# Patient Record
Sex: MALE
[De-identification: names, ages, dates, MRNs, and addresses within clinical notes are randomized per-mention and may not be internally consistent; named-entity substitution may affect disease eponyms.]

---

## 2024-05-15 PROBLEM — Z00.00 ENCOUNTER FOR PREVENTIVE HEALTH EXAMINATION: Status: ACTIVE | Noted: 2024-05-15

## 2024-05-17 ENCOUNTER — APPOINTMENT (OUTPATIENT)
Dept: CT IMAGING | Facility: CLINIC | Age: 79
End: 2024-05-17
Payer: MEDICARE

## 2024-05-17 PROCEDURE — 71260 CT THORAX DX C+: CPT

## 2024-05-17 PROCEDURE — 74177 CT ABD & PELVIS W/CONTRAST: CPT

## 2024-05-27 PROBLEM — K63.89 MASS OF COLON: Status: ACTIVE | Noted: 2024-05-27

## 2024-05-28 ENCOUNTER — APPOINTMENT (OUTPATIENT)
Dept: SURGICAL ONCOLOGY | Facility: CLINIC | Age: 79
End: 2024-05-28
Payer: MEDICARE

## 2024-05-28 VITALS
OXYGEN SATURATION: 96 % | BODY MASS INDEX: 28.12 KG/M2 | HEART RATE: 82 BPM | SYSTOLIC BLOOD PRESSURE: 127 MMHG | HEIGHT: 66 IN | DIASTOLIC BLOOD PRESSURE: 77 MMHG | WEIGHT: 175 LBS

## 2024-05-28 DIAGNOSIS — K63.89 OTHER SPECIFIED DISEASES OF INTESTINE: ICD-10-CM

## 2024-05-28 DIAGNOSIS — Z95.5 PRESENCE OF CORONARY ANGIOPLASTY IMPLANT AND GRAFT: ICD-10-CM

## 2024-05-28 DIAGNOSIS — Z87.891 PERSONAL HISTORY OF NICOTINE DEPENDENCE: ICD-10-CM

## 2024-05-28 PROCEDURE — 99204 OFFICE O/P NEW MOD 45 MIN: CPT

## 2024-05-28 RX ORDER — ATORVASTATIN CALCIUM 80 MG/1
TABLET, FILM COATED ORAL
Refills: 0 | Status: ACTIVE | COMMUNITY

## 2024-05-28 RX ORDER — CLOPIDOGREL 75 MG/1
75 TABLET, FILM COATED ORAL
Refills: 0 | Status: ACTIVE | COMMUNITY

## 2024-05-28 RX ORDER — ASPIRIN 81 MG
81 TABLET, DELAYED RELEASE (ENTERIC COATED) ORAL
Refills: 0 | Status: ACTIVE | COMMUNITY

## 2024-05-28 RX ORDER — DOLUTEGRAVIR SODIUM 50 MG/1
TABLET, FILM COATED ORAL
Refills: 0 | Status: ACTIVE | COMMUNITY

## 2024-05-28 RX ORDER — DARUNAVIR, COBICISTAT, EMTRICITABINE, AND TENOFOVIR ALAFENAMIDE 800; 150; 200; 10 MG/1; MG/1; MG/1; MG/1
800-150-200-10 TABLET, FILM COATED ORAL
Refills: 0 | Status: ACTIVE | COMMUNITY

## 2024-05-31 NOTE — ASSESSMENT
[FreeTextEntry1] : IMP: 77y/o M referred by Dr. Cadet for 50mm mass in the distal sigmoid colon and proximal rectum noted on colonoscopy exam 5/13/24. 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 Patient expressed verbal understanding. Written informed consent to be obtained in the preoperative period. I have discussed the diagnosis, therapeutic plan and options with the patient at length. Patient expressed verbal understanding to proceed with proposed plan. All questions answered.

## 2024-05-31 NOTE — HISTORY OF PRESENT ILLNESS
[de-identified] : Mr. ALBERTINA MASSEY is a 77y/o M referred by Dr. Cadet for 50mm mass in the distal sigmoid colon and proximal rectum noted on colonoscopy exam 5/13/24. Exam indication - Iron deficiency anemia.  CT C/A/P noted known cancer in the rectosigmoid colon with mildly prominent adjacent lymph nodes. Also, 2 nonspecific nodules in the lung bases. Colon Mass biopsied. 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

## 2024-05-31 NOTE — PHYSICAL EXAM
[FreeTextEntry1] : COVID -19 precautions as per [facility] policy was universally followed. [Normal] : supple, no neck mass and thyroid not enlarged [Normal Neck Lymph Nodes] : normal neck lymph nodes  [Normal Supraclavicular Lymph Nodes] : normal supraclavicular lymph nodes [Normal Groin Lymph Nodes] : normal groin lymph nodes [Normal Axillary Lymph Nodes] : normal axillary lymph nodes [Normal] : oriented to person, place and time, with appropriate affect [de-identified] : soft NTND

## 2024-05-31 NOTE — CONSULT LETTER
[FreeTextEntry3] : Benjamin Lao MD, CAMPBELL, FACS Director of Surgical Oncology- [facility] , Department of Surgery [facility] [place] [id number] Ph: [phone] Cell: [phone]

## 2024-06-12 ENCOUNTER — LABORATORY RESULT (OUTPATIENT)
Age: 79
End: 2024-06-12

## 2024-06-15 ENCOUNTER — APPOINTMENT (OUTPATIENT)
Dept: NUCLEAR MEDICINE | Facility: CLINIC | Age: 79
End: 2024-06-15
Payer: MEDICARE

## 2024-06-15 PROCEDURE — 78815 PET IMAGE W/CT SKULL-THIGH: CPT | Mod: PI

## 2024-06-15 PROCEDURE — A9552: CPT

## 2024-06-24 RX ORDER — PEG-3350, SODIUM SULFATE, SODIUM CHLORIDE, POTASSIUM CHLORIDE, SODIUM ASCORBATE AND ASCORBIC ACID 7.5-2.691G
100 KIT ORAL
Qty: 1 | Refills: 0 | Status: ACTIVE | COMMUNITY
Start: 2024-06-24 | End: 1900-01-01

## 2024-06-24 RX ORDER — NEOMYCIN SULFATE 500 MG/1
500 TABLET ORAL
Qty: 6 | Refills: 0 | Status: ACTIVE | COMMUNITY
Start: 2024-06-24 | End: 1900-01-01

## 2024-06-24 RX ORDER — METRONIDAZOLE 250 MG/1
250 TABLET ORAL
Qty: 3 | Refills: 0 | Status: ACTIVE | COMMUNITY
Start: 2024-06-24 | End: 1900-01-01

## 2024-06-24 RX ORDER — POTASSIUM CHLORIDE 1500 MG/1
20 TABLET, FILM COATED, EXTENDED RELEASE ORAL
Qty: 4 | Refills: 0 | Status: ACTIVE | COMMUNITY
Start: 2024-06-24 | End: 1900-01-01

## 2024-07-05 ENCOUNTER — APPOINTMENT (OUTPATIENT)
Dept: MRI IMAGING | Facility: CLINIC | Age: 79
End: 2024-07-05
Payer: MEDICARE

## 2024-07-05 PROCEDURE — 74183 MRI ABD W/O CNTR FLWD CNTR: CPT

## 2024-07-05 PROCEDURE — A9585: CPT | Mod: JW

## 2024-07-09 ENCOUNTER — OUTPATIENT (OUTPATIENT)
Dept: OUTPATIENT SERVICES | Facility: HOSPITAL | Age: 79
LOS: 1 days | End: 2024-07-09
Payer: MEDICARE

## 2024-07-09 VITALS
OXYGEN SATURATION: 98 % | DIASTOLIC BLOOD PRESSURE: 80 MMHG | WEIGHT: 171.96 LBS | HEIGHT: 63 IN | TEMPERATURE: 98 F | HEART RATE: 71 BPM | RESPIRATION RATE: 18 BRPM | SYSTOLIC BLOOD PRESSURE: 128 MMHG

## 2024-07-09 DIAGNOSIS — I10 ESSENTIAL (PRIMARY) HYPERTENSION: ICD-10-CM

## 2024-07-09 DIAGNOSIS — E78.5 HYPERLIPIDEMIA, UNSPECIFIED: ICD-10-CM

## 2024-07-09 DIAGNOSIS — Z90.89 ACQUIRED ABSENCE OF OTHER ORGANS: Chronic | ICD-10-CM

## 2024-07-09 DIAGNOSIS — Z01.818 ENCOUNTER FOR OTHER PREPROCEDURAL EXAMINATION: ICD-10-CM

## 2024-07-09 DIAGNOSIS — Z21 ASYMPTOMATIC HUMAN IMMUNODEFICIENCY VIRUS [HIV] INFECTION STATUS: ICD-10-CM

## 2024-07-09 DIAGNOSIS — Z29.9 ENCOUNTER FOR PROPHYLACTIC MEASURES, UNSPECIFIED: ICD-10-CM

## 2024-07-09 DIAGNOSIS — K63.89 OTHER SPECIFIED DISEASES OF INTESTINE: ICD-10-CM

## 2024-07-09 LAB
ANION GAP SERPL CALC-SCNC: 6 MMOL/L — SIGNIFICANT CHANGE UP (ref 5–17)
APTT BLD: 38.4 SEC — HIGH (ref 24.5–35.6)
BLD GP AB SCN SERPL QL: SIGNIFICANT CHANGE UP
BUN SERPL-MCNC: 7 MG/DL — SIGNIFICANT CHANGE UP (ref 7–18)
CALCIUM SERPL-MCNC: 9.1 MG/DL — SIGNIFICANT CHANGE UP (ref 8.4–10.5)
CHLORIDE SERPL-SCNC: 109 MMOL/L — HIGH (ref 96–108)
CO2 SERPL-SCNC: 27 MMOL/L — SIGNIFICANT CHANGE UP (ref 22–31)
CREAT SERPL-MCNC: 0.97 MG/DL — SIGNIFICANT CHANGE UP (ref 0.5–1.3)
EGFR: 81 ML/MIN/1.73M2 — SIGNIFICANT CHANGE UP
GLUCOSE SERPL-MCNC: 112 MG/DL — HIGH (ref 70–99)
HCT VFR BLD CALC: 42.8 % — SIGNIFICANT CHANGE UP (ref 39–50)
HGB BLD-MCNC: 13.8 G/DL — SIGNIFICANT CHANGE UP (ref 13–17)
INR BLD: 1.05 RATIO — SIGNIFICANT CHANGE UP (ref 0.85–1.18)
MCHC RBC-ENTMCNC: 29.7 PG — SIGNIFICANT CHANGE UP (ref 27–34)
MCHC RBC-ENTMCNC: 32.2 GM/DL — SIGNIFICANT CHANGE UP (ref 32–36)
MCV RBC AUTO: 92 FL — SIGNIFICANT CHANGE UP (ref 80–100)
NRBC # BLD: 0 /100 WBCS — SIGNIFICANT CHANGE UP (ref 0–0)
PLATELET # BLD AUTO: 281 K/UL — SIGNIFICANT CHANGE UP (ref 150–400)
POTASSIUM SERPL-MCNC: 4.7 MMOL/L — SIGNIFICANT CHANGE UP (ref 3.5–5.3)
POTASSIUM SERPL-SCNC: 4.7 MMOL/L — SIGNIFICANT CHANGE UP (ref 3.5–5.3)
PROTHROM AB SERPL-ACNC: 11.9 SEC — SIGNIFICANT CHANGE UP (ref 9.5–13)
RBC # BLD: 4.65 M/UL — SIGNIFICANT CHANGE UP (ref 4.2–5.8)
RBC # FLD: 13.5 % — SIGNIFICANT CHANGE UP (ref 10.3–14.5)
SODIUM SERPL-SCNC: 142 MMOL/L — SIGNIFICANT CHANGE UP (ref 135–145)
WBC # BLD: 13.07 K/UL — HIGH (ref 3.8–10.5)
WBC # FLD AUTO: 13.07 K/UL — HIGH (ref 3.8–10.5)

## 2024-07-09 NOTE — H&P PST ADULT - HISTORY OF PRESENT ILLNESS
79 yo male with history of HLD, HIV (viral load <20), reports the above.  Patient states has not had a colonoscopy for approximately nine years, and a routine colonoscopy this year revealed a mass in the left lower side of the intestine.  He is scheduled for : Robotic Assisted Low Anterior Resection, on 7/18/24

## 2024-07-09 NOTE — H&P PST ADULT - NSICDXPROCEDURE_GEN_ALL_CORE_FT
PROCEDURES:  Laparoscopic surgical removal of colon with anastomosis and colostomy 09-Jul-2024 10:44:23  Ivy Candelario

## 2024-07-09 NOTE — H&P PST ADULT - NSANTHOSAYNRD_GEN_A_CORE
No. KRIS screening performed.  STOP BANG Legend: 0-2 = LOW Risk; 3-4 = INTERMEDIATE Risk; 5-8 = HIGH Risk

## 2024-07-09 NOTE — H&P PST ADULT - PROBLEM SELECTOR PLAN 3
Continue all medications as prescribed  F/U with provider for viral load monitoring  Recent viral load <20

## 2024-07-10 LAB
A1C WITH ESTIMATED AVERAGE GLUCOSE RESULT: 5.7 % — HIGH (ref 4–5.6)
ESTIMATED AVERAGE GLUCOSE: 117 MG/DL — HIGH (ref 68–114)

## 2024-07-10 PROCEDURE — 36415 COLL VENOUS BLD VENIPUNCTURE: CPT

## 2024-07-10 PROCEDURE — 86900 BLOOD TYPING SEROLOGIC ABO: CPT

## 2024-07-10 PROCEDURE — 85610 PROTHROMBIN TIME: CPT

## 2024-07-10 PROCEDURE — 86850 RBC ANTIBODY SCREEN: CPT

## 2024-07-10 PROCEDURE — 83036 HEMOGLOBIN GLYCOSYLATED A1C: CPT

## 2024-07-10 PROCEDURE — 85027 COMPLETE CBC AUTOMATED: CPT

## 2024-07-10 PROCEDURE — 80048 BASIC METABOLIC PNL TOTAL CA: CPT

## 2024-07-10 PROCEDURE — 85730 THROMBOPLASTIN TIME PARTIAL: CPT

## 2024-07-10 PROCEDURE — 86901 BLOOD TYPING SEROLOGIC RH(D): CPT

## 2024-07-10 PROCEDURE — G0463: CPT

## 2024-07-17 PROBLEM — Z21 ASYMPTOMATIC HUMAN IMMUNODEFICIENCY VIRUS [HIV] INFECTION STATUS: Chronic | Status: ACTIVE | Noted: 2024-07-09

## 2024-07-17 PROBLEM — E78.5 HYPERLIPIDEMIA, UNSPECIFIED: Chronic | Status: ACTIVE | Noted: 2024-07-09

## 2024-07-18 ENCOUNTER — APPOINTMENT (OUTPATIENT)
Dept: SURGICAL ONCOLOGY | Facility: HOSPITAL | Age: 79
End: 2024-07-18

## 2024-07-18 RX ORDER — POTASSIUM CHLORIDE 600 MG/1
1 TABLET, FILM COATED, EXTENDED RELEASE ORAL
Refills: 0 | DISCHARGE

## 2024-07-18 RX ORDER — FERROUS SULFATE 325(65) MG
1 TABLET ORAL
Refills: 0 | DISCHARGE

## 2024-07-18 RX ORDER — METRONIDAZOLE 500 MG/1
1 TABLET ORAL
Refills: 0 | DISCHARGE

## 2024-07-18 RX ORDER — ASPIRIN 325 MG/1
1 TABLET, FILM COATED ORAL
Refills: 0 | DISCHARGE

## 2024-07-18 RX ORDER — PEG3350/SOD SULF,BICARB,CL/KCL 240-22.72G
0 SOLUTION, RECONSTITUTED, ORAL ORAL
Refills: 0 | DISCHARGE

## 2024-07-18 RX ORDER — DOLUTEGRAVIR SODIUM 5 MG/1
1 TABLET, FOR SUSPENSION ORAL
Refills: 0 | DISCHARGE

## 2024-07-18 RX ORDER — ATORVASTATIN CALCIUM 20 MG/1
1 TABLET, FILM COATED ORAL
Refills: 0 | DISCHARGE

## 2024-07-19 ENCOUNTER — APPOINTMENT (OUTPATIENT)
Dept: INTERVENTIONAL RADIOLOGY/VASCULAR | Facility: HOSPITAL | Age: 79
End: 2024-07-19

## 2024-07-19 ENCOUNTER — OUTPATIENT (OUTPATIENT)
Dept: OUTPATIENT SERVICES | Facility: HOSPITAL | Age: 79
LOS: 1 days | End: 2024-07-19
Payer: MEDICARE

## 2024-07-19 ENCOUNTER — TRANSCRIPTION ENCOUNTER (OUTPATIENT)
Age: 79
End: 2024-07-19

## 2024-07-19 VITALS
HEART RATE: 69 BPM | DIASTOLIC BLOOD PRESSURE: 87 MMHG | SYSTOLIC BLOOD PRESSURE: 155 MMHG | RESPIRATION RATE: 14 BRPM | TEMPERATURE: 98 F

## 2024-07-19 VITALS
SYSTOLIC BLOOD PRESSURE: 145 MMHG | DIASTOLIC BLOOD PRESSURE: 81 MMHG | OXYGEN SATURATION: 97 % | TEMPERATURE: 98 F | HEART RATE: 70 BPM | RESPIRATION RATE: 16 BRPM

## 2024-07-19 DIAGNOSIS — D64.9 ANEMIA, UNSPECIFIED: ICD-10-CM

## 2024-07-19 DIAGNOSIS — Z90.89 ACQUIRED ABSENCE OF OTHER ORGANS: Chronic | ICD-10-CM

## 2024-07-19 DIAGNOSIS — R60.9 EDEMA, UNSPECIFIED: ICD-10-CM

## 2024-07-19 PROCEDURE — 77001 FLUOROGUIDE FOR VEIN DEVICE: CPT

## 2024-07-19 PROCEDURE — C1769: CPT

## 2024-07-19 PROCEDURE — 76937 US GUIDE VASCULAR ACCESS: CPT

## 2024-07-19 PROCEDURE — 76937 US GUIDE VASCULAR ACCESS: CPT | Mod: 26

## 2024-07-19 PROCEDURE — 77001 FLUOROGUIDE FOR VEIN DEVICE: CPT | Mod: 26

## 2024-07-19 PROCEDURE — 36558 INSERT TUNNELED CV CATH: CPT

## 2024-07-19 PROCEDURE — C1788: CPT

## 2024-07-19 RX ORDER — HYDROMORPHONE HCL 0.2 MG/ML
1 INJECTION, SOLUTION INTRAVENOUS
Refills: 0 | Status: DISCONTINUED | OUTPATIENT
Start: 2024-07-19 | End: 2024-07-19

## 2024-07-19 RX ORDER — HYDROMORPHONE HCL 0.2 MG/ML
0.5 INJECTION, SOLUTION INTRAVENOUS
Refills: 0 | Status: DISCONTINUED | OUTPATIENT
Start: 2024-07-19 | End: 2024-07-19

## 2024-07-19 RX ORDER — ONDANSETRON HYDROCHLORIDE 2 MG/ML
4 INJECTION INTRAMUSCULAR; INTRAVENOUS ONCE
Refills: 0 | Status: DISCONTINUED | OUTPATIENT
Start: 2024-07-19 | End: 2024-07-19

## 2024-07-19 RX ORDER — DEXTROSE MONOHYDRATE AND SODIUM CHLORIDE 5; .3 G/100ML; G/100ML
1000 INJECTION, SOLUTION INTRAVENOUS
Refills: 0 | Status: DISCONTINUED | OUTPATIENT
Start: 2024-07-19 | End: 2024-07-19

## 2024-07-19 NOTE — ASU DISCHARGE PLAN (ADULT/PEDIATRIC) - CARE PROVIDER_API CALL
Sulema Cadet  Hematology/Oncology  [address], [address]  [place] [address]  Phone: [phone]  Fax: [phone]  Follow Up Time:

## 2024-10-14 ENCOUNTER — APPOINTMENT (OUTPATIENT)
Dept: MRI IMAGING | Facility: CLINIC | Age: 79
End: 2024-10-14
Payer: MEDICARE

## 2024-10-14 PROCEDURE — 74183 MRI ABD W/O CNTR FLWD CNTR: CPT

## 2024-10-14 PROCEDURE — A9585: CPT | Mod: JW

## 2024-10-24 ENCOUNTER — INPATIENT (INPATIENT)
Facility: HOSPITAL | Age: 79
LOS: 4 days | Discharge: HOME | DRG: 195 | End: 2024-10-29
Attending: STUDENT IN AN ORGANIZED HEALTH CARE EDUCATION/TRAINING PROGRAM | Admitting: STUDENT IN AN ORGANIZED HEALTH CARE EDUCATION/TRAINING PROGRAM
Payer: MEDICARE

## 2024-10-24 VITALS
TEMPERATURE: 98 F | SYSTOLIC BLOOD PRESSURE: 152 MMHG | RESPIRATION RATE: 18 BRPM | DIASTOLIC BLOOD PRESSURE: 96 MMHG | HEIGHT: 66 IN | OXYGEN SATURATION: 99 % | WEIGHT: 156.53 LBS | HEART RATE: 107 BPM

## 2024-10-24 DIAGNOSIS — E78.5 HYPERLIPIDEMIA, UNSPECIFIED: ICD-10-CM

## 2024-10-24 DIAGNOSIS — Z90.89 ACQUIRED ABSENCE OF OTHER ORGANS: Chronic | ICD-10-CM

## 2024-10-24 DIAGNOSIS — R03.0 ELEVATED BLOOD-PRESSURE READING, WITHOUT DIAGNOSIS OF HYPERTENSION: ICD-10-CM

## 2024-10-24 DIAGNOSIS — J18.9 PNEUMONIA, UNSPECIFIED ORGANISM: ICD-10-CM

## 2024-10-24 DIAGNOSIS — Z29.9 ENCOUNTER FOR PROPHYLACTIC MEASURES, UNSPECIFIED: ICD-10-CM

## 2024-10-24 DIAGNOSIS — D69.6 THROMBOCYTOPENIA, UNSPECIFIED: ICD-10-CM

## 2024-10-24 DIAGNOSIS — Z21 ASYMPTOMATIC HUMAN IMMUNODEFICIENCY VIRUS [HIV] INFECTION STATUS: ICD-10-CM

## 2024-10-24 LAB
ALBUMIN SERPL ELPH-MCNC: 3.2 G/DL — LOW (ref 3.5–5)
ALP SERPL-CCNC: 81 U/L — SIGNIFICANT CHANGE UP (ref 40–120)
ALT FLD-CCNC: 21 U/L DA — SIGNIFICANT CHANGE UP (ref 10–60)
ANION GAP SERPL CALC-SCNC: 5 MMOL/L — SIGNIFICANT CHANGE UP (ref 5–17)
ANISOCYTOSIS BLD QL: SLIGHT — SIGNIFICANT CHANGE UP
APPEARANCE UR: CLEAR — SIGNIFICANT CHANGE UP
AST SERPL-CCNC: 17 U/L — SIGNIFICANT CHANGE UP (ref 10–40)
BACTERIA # UR AUTO: ABNORMAL /HPF
BASOPHILS # BLD AUTO: 0 K/UL — SIGNIFICANT CHANGE UP (ref 0–0.2)
BASOPHILS NFR BLD AUTO: 0 % — SIGNIFICANT CHANGE UP (ref 0–2)
BILIRUB SERPL-MCNC: 1.3 MG/DL — HIGH (ref 0.2–1.2)
BILIRUB UR-MCNC: NEGATIVE — SIGNIFICANT CHANGE UP
BUN SERPL-MCNC: 7 MG/DL — SIGNIFICANT CHANGE UP (ref 7–18)
CALCIUM SERPL-MCNC: 9.1 MG/DL — SIGNIFICANT CHANGE UP (ref 8.4–10.5)
CHLORIDE SERPL-SCNC: 100 MMOL/L — SIGNIFICANT CHANGE UP (ref 96–108)
CK SERPL-CCNC: 39 U/L — SIGNIFICANT CHANGE UP (ref 35–232)
CO2 SERPL-SCNC: 27 MMOL/L — SIGNIFICANT CHANGE UP (ref 22–31)
COLOR SPEC: YELLOW — SIGNIFICANT CHANGE UP
CREAT SERPL-MCNC: 0.9 MG/DL — SIGNIFICANT CHANGE UP (ref 0.5–1.3)
D DIMER BLD IA.RAPID-MCNC: 2538 NG/ML DDU — HIGH
DIFF PNL FLD: NEGATIVE — SIGNIFICANT CHANGE UP
EGFR: 88 ML/MIN/1.73M2 — SIGNIFICANT CHANGE UP
ELLIPTOCYTES BLD QL SMEAR: SLIGHT — SIGNIFICANT CHANGE UP
EOSINOPHIL # BLD AUTO: 0 K/UL — SIGNIFICANT CHANGE UP (ref 0–0.5)
EOSINOPHIL NFR BLD AUTO: 0 % — SIGNIFICANT CHANGE UP (ref 0–6)
EPI CELLS # UR: SIGNIFICANT CHANGE UP
GLUCOSE SERPL-MCNC: 160 MG/DL — HIGH (ref 70–99)
GLUCOSE UR QL: 100 MG/DL
HCT VFR BLD CALC: 43.7 % — SIGNIFICANT CHANGE UP (ref 39–50)
HGB BLD-MCNC: 14.9 G/DL — SIGNIFICANT CHANGE UP (ref 13–17)
KETONES UR-MCNC: ABNORMAL MG/DL
LACTATE SERPL-SCNC: 1.8 MMOL/L — SIGNIFICANT CHANGE UP (ref 0.7–2)
LEUKOCYTE ESTERASE UR-ACNC: NEGATIVE — SIGNIFICANT CHANGE UP
LG PLATELETS BLD QL AUTO: SLIGHT — SIGNIFICANT CHANGE UP
LIDOCAIN IGE QN: 29 U/L — SIGNIFICANT CHANGE UP (ref 13–75)
LYMPHOCYTES # BLD AUTO: 0.75 K/UL — LOW (ref 1–3.3)
LYMPHOCYTES # BLD AUTO: 21 % — SIGNIFICANT CHANGE UP (ref 13–44)
MAGNESIUM SERPL-MCNC: 1.7 MG/DL — SIGNIFICANT CHANGE UP (ref 1.6–2.6)
MANUAL SMEAR VERIFICATION: SIGNIFICANT CHANGE UP
MCHC RBC-ENTMCNC: 30.8 PG — SIGNIFICANT CHANGE UP (ref 27–34)
MCHC RBC-ENTMCNC: 34.1 GM/DL — SIGNIFICANT CHANGE UP (ref 32–36)
MCV RBC AUTO: 90.3 FL — SIGNIFICANT CHANGE UP (ref 80–100)
MICROCYTES BLD QL: SLIGHT — SIGNIFICANT CHANGE UP
MONOCYTES # BLD AUTO: 0.57 K/UL — SIGNIFICANT CHANGE UP (ref 0–0.9)
MONOCYTES NFR BLD AUTO: 16 % — HIGH (ref 2–14)
NEUTROPHILS # BLD AUTO: 2.26 K/UL — SIGNIFICANT CHANGE UP (ref 1.8–7.4)
NEUTROPHILS NFR BLD AUTO: 57 % — SIGNIFICANT CHANGE UP (ref 43–77)
NEUTS BAND # BLD: 6 % — SIGNIFICANT CHANGE UP (ref 0–8)
NITRITE UR-MCNC: NEGATIVE — SIGNIFICANT CHANGE UP
NRBC # BLD: 0 /100 WBCS — SIGNIFICANT CHANGE UP (ref 0–0)
NT-PROBNP SERPL-SCNC: 1210 PG/ML — HIGH (ref 0–450)
PH UR: 7 — SIGNIFICANT CHANGE UP (ref 5–8)
PLAT MORPH BLD: NORMAL — SIGNIFICANT CHANGE UP
PLATELET # BLD AUTO: 93 K/UL — LOW (ref 150–400)
PLATELET COUNT - ESTIMATE: ABNORMAL
POIKILOCYTOSIS BLD QL AUTO: SLIGHT — SIGNIFICANT CHANGE UP
POTASSIUM SERPL-MCNC: 4.6 MMOL/L — SIGNIFICANT CHANGE UP (ref 3.5–5.3)
POTASSIUM SERPL-SCNC: 4.6 MMOL/L — SIGNIFICANT CHANGE UP (ref 3.5–5.3)
PROT SERPL-MCNC: 6.6 G/DL — SIGNIFICANT CHANGE UP (ref 6–8.3)
PROT UR-MCNC: ABNORMAL MG/DL
RBC # BLD: 4.84 M/UL — SIGNIFICANT CHANGE UP (ref 4.2–5.8)
RBC # FLD: 17.5 % — HIGH (ref 10.3–14.5)
RBC BLD AUTO: ABNORMAL
RBC CASTS # UR COMP ASSIST: SIGNIFICANT CHANGE UP /HPF
SODIUM SERPL-SCNC: 132 MMOL/L — LOW (ref 135–145)
SP GR SPEC: 1.04 — HIGH (ref 1–1.03)
TROPONIN I, HIGH SENSITIVITY RESULT: 45 NG/L — SIGNIFICANT CHANGE UP
UROBILINOGEN FLD QL: 1 MG/DL — SIGNIFICANT CHANGE UP (ref 0.2–1)
WBC # BLD: 3.58 K/UL — LOW (ref 3.8–10.5)
WBC # FLD AUTO: 3.58 K/UL — LOW (ref 3.8–10.5)
WBC UR QL: 2 /HPF — SIGNIFICANT CHANGE UP (ref 0–5)

## 2024-10-24 PROCEDURE — 71275 CT ANGIOGRAPHY CHEST: CPT | Mod: 26,MC

## 2024-10-24 PROCEDURE — 93010 ELECTROCARDIOGRAM REPORT: CPT

## 2024-10-24 PROCEDURE — 99285 EMERGENCY DEPT VISIT HI MDM: CPT

## 2024-10-24 PROCEDURE — 74177 CT ABD & PELVIS W/CONTRAST: CPT | Mod: 26,MC

## 2024-10-24 PROCEDURE — 99223 1ST HOSP IP/OBS HIGH 75: CPT | Mod: GC

## 2024-10-24 RX ORDER — MAGNESIUM, ALUMINUM HYDROXIDE 200-200 MG
30 TABLET,CHEWABLE ORAL EVERY 4 HOURS
Refills: 0 | Status: DISCONTINUED | OUTPATIENT
Start: 2024-10-24 | End: 2024-10-29

## 2024-10-24 RX ORDER — ONDANSETRON HYDROCHLORIDE 2 MG/ML
4 INJECTION, SOLUTION INTRAMUSCULAR; INTRAVENOUS ONCE
Refills: 0 | Status: COMPLETED | OUTPATIENT
Start: 2024-10-24 | End: 2024-10-24

## 2024-10-24 RX ORDER — ACETAMINOPHEN 500 MG
1000 TABLET ORAL ONCE
Refills: 0 | Status: COMPLETED | OUTPATIENT
Start: 2024-10-24 | End: 2024-10-24

## 2024-10-24 RX ORDER — ASCORBIC ACID 500 MG
500 TABLET ORAL DAILY
Refills: 0 | Status: DISCONTINUED | OUTPATIENT
Start: 2024-10-24 | End: 2024-10-29

## 2024-10-24 RX ORDER — INFLUENZ VIR VAC TV P-SURF2003 15MCG/.5ML
0.5 SYRINGE (ML) INTRAMUSCULAR ONCE
Refills: 0 | Status: DISCONTINUED | OUTPATIENT
Start: 2024-10-24 | End: 2024-10-29

## 2024-10-24 RX ORDER — AZITHROMYCIN DIHYDRATE 200 MG/5ML
500 POWDER, FOR SUSPENSION ORAL EVERY 24 HOURS
Refills: 0 | Status: COMPLETED | OUTPATIENT
Start: 2024-10-25 | End: 2024-10-26

## 2024-10-24 RX ORDER — AZITHROMYCIN DIHYDRATE 200 MG/5ML
500 POWDER, FOR SUSPENSION ORAL ONCE
Refills: 0 | Status: COMPLETED | OUTPATIENT
Start: 2024-10-24 | End: 2024-10-24

## 2024-10-24 RX ORDER — CEFTRIAXONE SODIUM 10 G
1000 VIAL (EA) INJECTION ONCE
Refills: 0 | Status: COMPLETED | OUTPATIENT
Start: 2024-10-24 | End: 2024-10-24

## 2024-10-24 RX ORDER — SODIUM CHLORIDE 9 MG/ML
1000 INJECTION, SOLUTION INTRAMUSCULAR; INTRAVENOUS; SUBCUTANEOUS
Refills: 0 | Status: DISCONTINUED | OUTPATIENT
Start: 2024-10-24 | End: 2024-10-24

## 2024-10-24 RX ORDER — CEFTRIAXONE SODIUM 10 G
1000 VIAL (EA) INJECTION EVERY 24 HOURS
Refills: 0 | Status: COMPLETED | OUTPATIENT
Start: 2024-10-24 | End: 2024-10-28

## 2024-10-24 RX ORDER — FAMOTIDINE 10 MG/ML
20 INJECTION INTRAVENOUS ONCE
Refills: 0 | Status: COMPLETED | OUTPATIENT
Start: 2024-10-24 | End: 2024-10-24

## 2024-10-24 RX ORDER — MORPHINE SULFATE 30 MG/1
4 TABLET, EXTENDED RELEASE ORAL ONCE
Refills: 0 | Status: DISCONTINUED | OUTPATIENT
Start: 2024-10-24 | End: 2024-10-24

## 2024-10-24 RX ORDER — ACETAMINOPHEN 500 MG
650 TABLET ORAL EVERY 6 HOURS
Refills: 0 | Status: DISCONTINUED | OUTPATIENT
Start: 2024-10-24 | End: 2024-10-29

## 2024-10-24 RX ORDER — DOLUTEGRAVIR SODIUM 10 MG/1
50 TABLET, FILM COATED ORAL DAILY
Refills: 0 | Status: DISCONTINUED | OUTPATIENT
Start: 2024-10-24 | End: 2024-10-29

## 2024-10-24 RX ORDER — FERROUS SULFATE 325(65) MG
325 TABLET ORAL DAILY
Refills: 0 | Status: DISCONTINUED | OUTPATIENT
Start: 2024-10-24 | End: 2024-10-29

## 2024-10-24 RX ORDER — HEPARIN SODIUM 10000 [USP'U]/ML
5000 INJECTION INTRAVENOUS; SUBCUTANEOUS EVERY 8 HOURS
Refills: 0 | Status: DISCONTINUED | OUTPATIENT
Start: 2024-10-24 | End: 2024-10-25

## 2024-10-24 RX ORDER — SODIUM CHLORIDE 9 MG/ML
1000 INJECTION, SOLUTION INTRAMUSCULAR; INTRAVENOUS; SUBCUTANEOUS ONCE
Refills: 0 | Status: COMPLETED | OUTPATIENT
Start: 2024-10-24 | End: 2024-10-24

## 2024-10-24 RX ORDER — SODIUM CHLORIDE 9 MG/ML
1000 INJECTION, SOLUTION INTRAMUSCULAR; INTRAVENOUS; SUBCUTANEOUS
Refills: 0 | Status: DISCONTINUED | OUTPATIENT
Start: 2024-10-24 | End: 2024-10-25

## 2024-10-24 RX ORDER — ONDANSETRON HYDROCHLORIDE 2 MG/ML
4 INJECTION, SOLUTION INTRAMUSCULAR; INTRAVENOUS EVERY 8 HOURS
Refills: 0 | Status: DISCONTINUED | OUTPATIENT
Start: 2024-10-24 | End: 2024-10-29

## 2024-10-24 RX ORDER — ASPIRIN/MAG CARB/ALUMINUM AMIN 325 MG
81 TABLET ORAL DAILY
Refills: 0 | Status: DISCONTINUED | OUTPATIENT
Start: 2024-10-24 | End: 2024-10-29

## 2024-10-24 RX ORDER — MELATONIN 5 MG
3 TABLET ORAL AT BEDTIME
Refills: 0 | Status: DISCONTINUED | OUTPATIENT
Start: 2024-10-24 | End: 2024-10-29

## 2024-10-24 RX ADMIN — SODIUM CHLORIDE 100 MILLILITER(S): 9 INJECTION, SOLUTION INTRAMUSCULAR; INTRAVENOUS; SUBCUTANEOUS at 23:55

## 2024-10-24 RX ADMIN — Medication 81 MILLIGRAM(S): at 23:34

## 2024-10-24 RX ADMIN — Medication 100 MILLIGRAM(S): at 17:35

## 2024-10-24 RX ADMIN — Medication 650 MILLIGRAM(S): at 23:51

## 2024-10-24 RX ADMIN — DOLUTEGRAVIR SODIUM 50 MILLIGRAM(S): 10 TABLET, FILM COATED ORAL at 23:33

## 2024-10-24 RX ADMIN — Medication 400 MILLIGRAM(S): at 20:00

## 2024-10-24 RX ADMIN — Medication 3 MILLIGRAM(S): at 23:34

## 2024-10-24 RX ADMIN — FAMOTIDINE 20 MILLIGRAM(S): 10 INJECTION INTRAVENOUS at 17:34

## 2024-10-24 RX ADMIN — SODIUM CHLORIDE 1000 MILLILITER(S): 9 INJECTION, SOLUTION INTRAMUSCULAR; INTRAVENOUS; SUBCUTANEOUS at 14:30

## 2024-10-24 RX ADMIN — SODIUM CHLORIDE 1000 MILLILITER(S): 9 INJECTION, SOLUTION INTRAMUSCULAR; INTRAVENOUS; SUBCUTANEOUS at 13:30

## 2024-10-24 RX ADMIN — Medication 325 MILLIGRAM(S): at 23:34

## 2024-10-24 RX ADMIN — Medication 100 MILLIGRAM(S): at 23:36

## 2024-10-24 RX ADMIN — Medication 500 MILLIGRAM(S): at 23:34

## 2024-10-24 RX ADMIN — MORPHINE SULFATE 4 MILLIGRAM(S): 30 TABLET, EXTENDED RELEASE ORAL at 14:00

## 2024-10-24 RX ADMIN — Medication 10 MILLIGRAM(S): at 23:35

## 2024-10-24 RX ADMIN — ONDANSETRON HYDROCHLORIDE 4 MILLIGRAM(S): 2 INJECTION, SOLUTION INTRAMUSCULAR; INTRAVENOUS at 13:30

## 2024-10-24 RX ADMIN — AZITHROMYCIN DIHYDRATE 255 MILLIGRAM(S): 200 POWDER, FOR SUSPENSION ORAL at 18:07

## 2024-10-24 RX ADMIN — MORPHINE SULFATE 4 MILLIGRAM(S): 30 TABLET, EXTENDED RELEASE ORAL at 13:30

## 2024-10-24 NOTE — H&P ADULT - PROBLEM SELECTOR PLAN 1
CT chest showing RLL consolidation  s/p ceftraixone and azithromycin in the ED  f/u legionella, mycoplasma,strep pneumo  f/u sputum Cx, BCx  c/w Ceftriaxone+ Azithromycin

## 2024-10-24 NOTE — H&P ADULT - HISTORY OF PRESENT ILLNESS
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 Patient last received chemo 1 week ago. Pateint stated the pain is similar to previous pain but more severe. It is non radiating, not associated with food intake, inhalation or position. Patient denies any fever, chills, dizziness ,headache, SOB, cough, palpitations, nausea, vomiting, diarrhea, abdominal pain, urinary symptoms, lower extremity pain, or edema. Patient denies recent travel or sick contacts.

## 2024-10-24 NOTE — ED PROVIDER NOTE - OBJECTIVE STATEMENT
79-year-old male with history of HIV, last viral load undetected in July, HLD, colon CA diagnosed 7/24 with liver mets.   Patient received chemo every 2 weeks, last chemo Wednesday. patient complaining of constant right sided chest/right upper quadrant pain since yesterday, feels like indigestion, spasm, associated with increased fatigue, decreased appetite, vomited x 1 yesterday.  He denies SOB, fever, coughing, dysuria, recent traveling.  Last BM yesterday, no BRBPR.  Patient took Tylenol with no relief.  Patient endorsed with similar pain in the past, but not as intense.  Pain is worse with deep inspiration

## 2024-10-24 NOTE — H&P ADULT - PROBLEM SELECTOR PLAN 3
Patient on X at home  c/w home medications Patient on symtuza and tivicay at home  c/w home medications

## 2024-10-24 NOTE — ED PROVIDER NOTE - PROGRESS NOTE DETAILS
Lab/CT result explained to patient and wife   patient with no PE, but with right lower lobe infiltrate and esophagitis   given patient's underlying medical history, currently on chemo, also with 6% bands, will admit patient  Case discussed with Dr. Carias, hospitalist  Delayed in diagnosis pneumonia, patient with atypical presentation

## 2024-10-24 NOTE — ED PROVIDER NOTE - CLINICAL SUMMARY MEDICAL DECISION MAKING FREE TEXT BOX
79-year-old male with history of HTN, HLD, recently diagnosed colon CA with liver mets, complaining of right-sided chest pain and right upper quadrant pain since yesterday.  Given patient's underlying medical history, concern for PE, metastatic pain, unlikely patient with ACS, pneumonia, will get labs, CTA, give analgesic and reassess

## 2024-10-24 NOTE — PATIENT PROFILE ADULT - FUNCTIONAL ASSESSMENT - DAILY ACTIVITY ASSESSMENT TYPE
Action Requested: Summary for Provider     []  Critical Lab, Recommendations Needed  [] Need Additional Advice  [x]   FYI    []   Need Orders  [] Need Medications Sent to Pharmacy  []  Other     SUMMARY: Pt contacts clinic c/o low blood sugar for several w Admission

## 2024-10-24 NOTE — ED ADULT TRIAGE NOTE - TEMP AT ED ARRIVAL (C)
----- Message from Michael Cheney MD sent at 1/15/2021 12:07 PM EST -----  ER f/u hemorrhoid  ----- Message -----  From: Automatic Discharge Provider  Sent: 1/14/2021   5:01 PM EST  To: Michael Cheney MD 36.9

## 2024-10-24 NOTE — ED ADULT NURSE NOTE - NSFALLRISKINTERV_ED_ALL_ED
Assistance OOB with selected safe patient handling equipment if applicable/Communicate fall risk and risk factors to all staff, patient, and family/Monitor gait and stability/Provide patient with walking aids/Provide visual cue: yellow wristband, yellow gown, etc/Reinforce activity limits and safety measures with patient and family/Toileting schedule using arm’s reach rule for commode and bathroom/Call bell, personal items and telephone in reach/Instruct patient to call for assistance before getting out of bed/chair/stretcher/Non-slip footwear applied when patient is off stretcher/[place] to call system/Physically safe environment - no spills, clutter or unnecessary equipment/Purposeful Proactive Rounding/Room/bathroom lighting operational, light cord in reach

## 2024-10-24 NOTE — H&P ADULT - PROBLEM SELECTOR PLAN 5
elevated blood pressure readings on admission  likely 2/2 pain  consider amlodipine 5 if pain persists.

## 2024-10-24 NOTE — ED PROVIDER NOTE - CARE PLAN
1 Principal Discharge DX:	Pneumonia  Secondary Diagnosis:	Right-sided chest pain  Secondary Diagnosis:	Esophagitis

## 2024-10-24 NOTE — H&P ADULT - NSHPPHYSICALEXAM_GEN_ALL_CORE
PHYSICAL EXAMINATION:  GENERAL: NAD  HEAD:  Atraumatic, Normocephalic  EYES:  conjunctiva and sclera clear  NECK: Supple, No JVD, Normal thyroid  CHEST/LUNG: Right lower lung field crackles. chemo port non errythematous  HEART: Regular rate and rhythm; No murmurs, rubs, or gallops  ABDOMEN: Soft, Nontender, Nondistended; Bowel sounds present  NERVOUS SYSTEM:  Alert & Oriented X3,    EXTREMITIES:  2+ Peripheral Pulses, No clubbing, cyanosis, or edema  SKIN: warm dry    T(C): 36.9 (10-24-24 @ 19:35), Max: 36.9 (10-24-24 @ 12:32)  HR: 96 (10-24-24 @ 19:35) (96 - 107)  BP: 162/93 (10-24-24 @ 19:35) (152/96 - 184/102)  RR: 18 (10-24-24 @ 19:35) (18 - 18)  SpO2: 98% (10-24-24 @ 19:35) (97% - 99%)

## 2024-10-24 NOTE — H&P ADULT - PROBLEM SELECTOR PLAN 2
Plt 93  bilirubin elevated but no schistocytes on peripheral smear  possibly ISO chemo vs acute infection

## 2024-10-24 NOTE — PATIENT PROFILE ADULT - PUBLIC BENEFITS
no
FAMILY HISTORY:  Family hx of lung cancer, brother,  age 82, used to smoke with pt    Sibling  Still living? Unknown  Family history of diabetes mellitus, Age at diagnosis: Age Unknown

## 2024-10-24 NOTE — ED ADULT NURSE NOTE - NSFALLLASTSIX_ED_ALL_ED
No. Hydroxyzine Counseling: Patient advised that the medication is sedating and not to drive a car after taking this medication.  Patient informed of potential adverse effects including but not limited to dry mouth, urinary retention, and blurry vision.  The patient verbalized understanding of the proper use and possible adverse effects of hydroxyzine.  All of the patient's questions and concerns were addressed.

## 2024-10-24 NOTE — H&P ADULT - ATTENDING COMMENTS
CC:    Chest pain   S:   Patient reports he has  been feeling R sided chest pain yesterday and having felt some indigestion.  R chest pain is intermittent, sharp, he feels it when he’s moving, not at rest.  He also had one episode of vomiting.  He has not been eating or sleeping since chemo has started. He was diagnosed with colon CA with liver mets in 7/24.     O:   T98.1 HR 99 /102 RR 18 O2 sat 97%   PE:   Gen: Oriented, alert, No acute distress Eyes: conjunctivae and lid normal, sclera clear with no icterus ENT: nose and throat exam normal CVS: S1, S2, RRR; no murmur, no rubs, no gallops Pulm: Good air exchange, Breath sounds equal bilaterally, no rales rhonchi,  no wheezes Chest: nontender, no chest deformity, chest movement symmetrical Gl: abdomen soft, nontender, nondistended, bowel sounds normoactive, no masses palpated Musk: no msk pain, no joint swelling Skin: no skin lesions, skin turgor normal, warm and well perfused Neuro: Awake, alert,Psych: normal affect, insight      A/P:   Mr. Zavala is a 78 yo man with PMH significant for HIV/AIDS, Hepatitis B, iron deficiecny anemia, adenoCA of colon (followed by [facility]) who presents with R sided chest pain found to have RLL pneumonia.      #RLL Pneumonia   #Esophagitis   #AdenoCA of Colon   #HIV/AIDS   #Hepatitis B   #MAISHA   #Thrombocytopenia   #Hyponatremia   -will start IV ceftriaxone, azithromycin   -start IV fluids   -send legionella, MRSA, mycoplasma, strep pneumoniae, sputum cx   -nutrition consult   -History also obtained from wife.   -Consult heme onc [facility] as patient follows them. Started FOLFOX since 7/2024   -Continue medications: atorvastatin 10 mg tablet,  Aspirin 81 mg , Calcium 600 + D(3) 600 mg­400 unit Tab, clopidogrel 75 mg tablet, Tivicay , complegte med rec  -obtain HIV viral load   -monitor CBC   -f/u blood cx   -f/u UA   -trend LFTs   -DVT ppx: lovenox      Labs reviewed:    WBC 3.58 H/H 14.9/43.7 Plt 93 DDimer 2538   Na 132 K4.6 Cl 100 BUN 7 Creatinine 0.9 Alb 3.2 Bili 1.3 AST/ALT 17/21 Lactate 1.8 Lipase 29 Trop 45 ProBNP 1210 Mag 1.7 CK 39   Imaging reviewed:    CTA Chest, abdomen   1.  No pulmonary embolism.   2.  Small right lower lobe consolidation is suspicious for pneumonia.   3.  New severe wall thickening of esophagus with surrounding    infiltration, either esophagitis or inflammation related to recent    vomiting.

## 2024-10-24 NOTE — ED ADULT NURSE NOTE - OBJECTIVE STATEMENT
Pt reports hx. of liver CA, chemo every 2 wks, last chemo last Wednesday, c/o chest pain and vomiting x yesterday.

## 2024-10-24 NOTE — H&P ADULT - ASSESSMENT
79-year-old male with history of HIV, last viral load undetected in July, HLD, colon CA diagnosed 7/24 with liver mets.

## 2024-10-25 DIAGNOSIS — K22.89 OTHER SPECIFIED DISEASE OF ESOPHAGUS: ICD-10-CM

## 2024-10-25 DIAGNOSIS — C18.9 MALIGNANT NEOPLASM OF COLON, UNSPECIFIED: ICD-10-CM

## 2024-10-25 LAB
4/8 RATIO: 0.58 RATIO — LOW (ref 0.86–4.14)
ABS CD8: 638 CELLS/UL — SIGNIFICANT CHANGE UP (ref 90–775)
ALBUMIN SERPL ELPH-MCNC: 2.8 G/DL — LOW (ref 3.5–5)
ALP SERPL-CCNC: 66 U/L — SIGNIFICANT CHANGE UP (ref 40–120)
ALT FLD-CCNC: 17 U/L DA — SIGNIFICANT CHANGE UP (ref 10–60)
ANION GAP SERPL CALC-SCNC: 2 MMOL/L — LOW (ref 5–17)
AST SERPL-CCNC: 15 U/L — SIGNIFICANT CHANGE UP (ref 10–40)
BILIRUB SERPL-MCNC: 0.7 MG/DL — SIGNIFICANT CHANGE UP (ref 0.2–1.2)
BUN SERPL-MCNC: 7 MG/DL — SIGNIFICANT CHANGE UP (ref 7–18)
CALCIUM SERPL-MCNC: 8.8 MG/DL — SIGNIFICANT CHANGE UP (ref 8.4–10.5)
CD16+CD56+ CELLS NFR BLD: 11 % — SIGNIFICANT CHANGE UP (ref 7–27)
CD16+CD56+ CELLS NFR SPEC: 162 CELLS/UL — SIGNIFICANT CHANGE UP (ref 80–426)
CD19 BLASTS SPEC-ACNC: 17 % — SIGNIFICANT CHANGE UP (ref 4–18)
CD19 BLASTS SPEC-ACNC: 253 CELLS/UL — SIGNIFICANT CHANGE UP (ref 32–326)
CD3 BLASTS SPEC-ACNC: 1016 CELLS/UL — SIGNIFICANT CHANGE UP (ref 396–2024)
CD3 BLASTS SPEC-ACNC: 70 % — SIGNIFICANT CHANGE UP (ref 58–84)
CD4 %: 25 % — LOW (ref 30–56)
CD8 %: 44 % — HIGH (ref 11–43)
CHLORIDE SERPL-SCNC: 103 MMOL/L — SIGNIFICANT CHANGE UP (ref 96–108)
CO2 SERPL-SCNC: 29 MMOL/L — SIGNIFICANT CHANGE UP (ref 22–31)
CREAT SERPL-MCNC: 0.92 MG/DL — SIGNIFICANT CHANGE UP (ref 0.5–1.3)
EGFR: 86 ML/MIN/1.73M2 — SIGNIFICANT CHANGE UP
GLUCOSE SERPL-MCNC: 165 MG/DL — HIGH (ref 70–99)
HCT VFR BLD CALC: 40.7 % — SIGNIFICANT CHANGE UP (ref 39–50)
HGB BLD-MCNC: 13.8 G/DL — SIGNIFICANT CHANGE UP (ref 13–17)
MAGNESIUM SERPL-MCNC: 1.7 MG/DL — SIGNIFICANT CHANGE UP (ref 1.6–2.6)
MCHC RBC-ENTMCNC: 30.7 PG — SIGNIFICANT CHANGE UP (ref 27–34)
MCHC RBC-ENTMCNC: 33.9 GM/DL — SIGNIFICANT CHANGE UP (ref 32–36)
MCV RBC AUTO: 90.4 FL — SIGNIFICANT CHANGE UP (ref 80–100)
NRBC # BLD: 0 /100 WBCS — SIGNIFICANT CHANGE UP (ref 0–0)
PHOSPHATE SERPL-MCNC: 2.4 MG/DL — LOW (ref 2.5–4.5)
PLATELET # BLD AUTO: 85 K/UL — LOW (ref 150–400)
POTASSIUM SERPL-MCNC: 4.2 MMOL/L — SIGNIFICANT CHANGE UP (ref 3.5–5.3)
POTASSIUM SERPL-SCNC: 4.2 MMOL/L — SIGNIFICANT CHANGE UP (ref 3.5–5.3)
PROT SERPL-MCNC: 5.6 G/DL — LOW (ref 6–8.3)
RBC # BLD: 4.5 M/UL — SIGNIFICANT CHANGE UP (ref 4.2–5.8)
RBC # FLD: 18 % — HIGH (ref 10.3–14.5)
SODIUM SERPL-SCNC: 134 MMOL/L — LOW (ref 135–145)
T-CELL CD4 SUBSET PNL BLD: 369 CELLS/UL — SIGNIFICANT CHANGE UP (ref 325–1251)
WBC # BLD: 5.91 K/UL — SIGNIFICANT CHANGE UP (ref 3.8–10.5)
WBC # FLD AUTO: 5.91 K/UL — SIGNIFICANT CHANGE UP (ref 3.8–10.5)

## 2024-10-25 PROCEDURE — 99222 1ST HOSP IP/OBS MODERATE 55: CPT | Mod: GC

## 2024-10-25 PROCEDURE — 99233 SBSQ HOSP IP/OBS HIGH 50: CPT | Mod: GC

## 2024-10-25 RX ORDER — ENOXAPARIN SODIUM 40MG/0.4ML
40 SYRINGE (ML) SUBCUTANEOUS EVERY 24 HOURS
Refills: 0 | Status: DISCONTINUED | OUTPATIENT
Start: 2024-10-25 | End: 2024-10-29

## 2024-10-25 RX ORDER — PANTOPRAZOLE SODIUM 40 MG/1
40 TABLET, DELAYED RELEASE ORAL
Refills: 0 | Status: DISCONTINUED | OUTPATIENT
Start: 2024-10-25 | End: 2024-10-29

## 2024-10-25 RX ORDER — LIDOCAINE HYDROCHLORIDE 40 MG/ML
10 SOLUTION TOPICAL DAILY
Refills: 0 | Status: DISCONTINUED | OUTPATIENT
Start: 2024-10-25 | End: 2024-10-26

## 2024-10-25 RX ORDER — LIDOCAINE HYDROCHLORIDE 40 MG/ML
1 SOLUTION TOPICAL DAILY
Refills: 0 | Status: DISCONTINUED | OUTPATIENT
Start: 2024-10-25 | End: 2024-10-27

## 2024-10-25 RX ADMIN — SODIUM CHLORIDE 100 MILLILITER(S): 9 INJECTION, SOLUTION INTRAMUSCULAR; INTRAVENOUS; SUBCUTANEOUS at 12:04

## 2024-10-25 RX ADMIN — Medication 40 MILLIGRAM(S): at 21:26

## 2024-10-25 RX ADMIN — Medication 500 MILLIGRAM(S): at 12:04

## 2024-10-25 RX ADMIN — Medication 10 MILLIGRAM(S): at 21:25

## 2024-10-25 RX ADMIN — Medication 650 MILLIGRAM(S): at 00:11

## 2024-10-25 RX ADMIN — Medication 100 MILLIGRAM(S): at 23:19

## 2024-10-25 RX ADMIN — LIDOCAINE HYDROCHLORIDE 10 MILLILITER(S): 40 SOLUTION TOPICAL at 21:26

## 2024-10-25 RX ADMIN — Medication 81 MILLIGRAM(S): at 12:04

## 2024-10-25 RX ADMIN — Medication 3 MILLIGRAM(S): at 21:31

## 2024-10-25 RX ADMIN — Medication 325 MILLIGRAM(S): at 12:04

## 2024-10-25 RX ADMIN — DOLUTEGRAVIR SODIUM 50 MILLIGRAM(S): 10 TABLET, FILM COATED ORAL at 12:04

## 2024-10-25 RX ADMIN — AZITHROMYCIN DIHYDRATE 255 MILLIGRAM(S): 200 POWDER, FOR SUSPENSION ORAL at 05:31

## 2024-10-25 NOTE — PROGRESS NOTE ADULT - PROBLEM SELECTOR PLAN 3
Patient on symtuza and tivicay at home  c/w home medications hx of colon CA with liver mets, dx 7/2024  -follows with [facility]  -Started FOLFOX since 7/2024   -heme/onc ([facility]) consult

## 2024-10-25 NOTE — PROGRESS NOTE ADULT - ASSESSMENT
79-year-old male with history of HIV, last viral load undetected in July, HLD, colon CA diagnosed 7/24 with liver mets. 79M HIV, last viral load undetected in July, HLD, colon CA diagnosed 7/24 with liver mets.  Patient received chemo every 2 weeks, last chemo Wednesday 10/23, presenting with substernal pressure and globus sensation associated with one episode of vomiting. Found to have significant esophageal thickening on CT with incidental RLL pneumonia. Admitted to medicine for management of pneumonia in s/o of immunosuppression and further work-up of esophageal wall thickening.

## 2024-10-25 NOTE — PROGRESS NOTE ADULT - PROBLEM SELECTOR PLAN 5
elevated blood pressure readings on admission  likely 2/2 pain  consider amlodipine 5 if pain persists. Patient on symtuza and tivicay at home  c/w home medications  f/u viral load and CD4 count

## 2024-10-25 NOTE — PROGRESS NOTE ADULT - PROBLEM SELECTOR PLAN 4
Patient on atorvastatin 10 at home  c/w home medications Plt 93  bilirubin mildly elevated 1.3 but hbg normal and no schistocytes on peripheral smear  D-dimer 2538  CTA chest negative for PE   likely ISO chemo vs acute infection  f/u fibrinogen  f/u b12 and folate  [facility] consult

## 2024-10-25 NOTE — CONSULT NOTE ADULT - SUBJECTIVE AND OBJECTIVE BOX
[facility] GI CONSULTATION    Patient is a 79y old  Male who presents with a chief complaint of Weakness (25 Oct 2024 09:06)    HPI:  79M PMHx HIV, Stage 4 colon CA w/ liver mets and HLD presented to the ED with c/o right sided chest pain and generalized weakness for 1 day. Patient last received chemo 1 week ago. Pateint stated the pain is similar to previous pain but more severe. It is non radiating, not associated with food intake, inhalation or position. Patient denies any fever, chills, dizziness ,headache, SOB, cough, palpitations, nausea, vomiting, diarrhea, abdominal pain, urinary symptoms, lower extremity pain, or edema. Patient denies recent travel or sick contacts.  (24 Oct 2024 19:57)      GI HPI:  Resting comfortably in bed.  Denies nausea, vomiting or bloody stools.  Not tolerating regular diet.      PMH/PSH:  PAST MEDICAL & SURGICAL HISTORY:  HIV positive      Hyperlipidemia      History of colon cancer      History of tonsillectomy        FH:  FAMILY HISTORY:      MEDS:  MEDICATIONS  (STANDING):  ascorbic acid 500 milliGRAM(s) Oral daily  aspirin  chewable 81 milliGRAM(s) Oral daily  atorvastatin 10 milliGRAM(s) Oral at bedtime  azithromycin  IVPB 500 milliGRAM(s) IV Intermittent every 24 hours  cefTRIAXone   IVPB 1000 milliGRAM(s) IV Intermittent every 24 hours  dolutegravir 50 milliGRAM(s) Oral daily  ferrous    sulfate 325 milliGRAM(s) Oral daily  heparin   Injectable 5000 Unit(s) SubCutaneous every 8 hours  influenza  Vaccine (HIGH DOSE) 0.5 milliLiter(s) IntraMuscular once  sodium chloride 0.9%. 1000 milliLiter(s) (100 mL/Hr) IV Continuous <Continuous>    MEDICATIONS  (PRN):  acetaminophen     Tablet .. 650 milliGRAM(s) Oral every 6 hours PRN Temp greater or equal to 38C (100.4F), Mild Pain (1 - 3)  aluminum hydroxide/magnesium hydroxide/simethicone Suspension 30 milliLiter(s) Oral every 4 hours PRN Dyspepsia  melatonin 3 milliGRAM(s) Oral at bedtime PRN Insomnia  ondansetron Injectable 4 milliGRAM(s) IV Push every 8 hours PRN Nausea and/or Vomiting    Allergies    No Known Allergies    Intolerances            CONSTITUTIONAL:  No weight loss, fever, chills, weakness or fatigue.  HEENT:  Eyes:  No visual loss, blurred vision, double vision or yellow sclerae. Ears, Nose, Throat:  No hearing loss, sneezing, congestion, runny nose or sore throat.  SKIN:  No rash or itching.  CARDIOVASCULAR:  No chest pain, chest pressure or chest discomfort. No palpitations or edema.  RESPIRATORY:  No shortness of breath, cough or sputum.  GASTROINTESTINAL:  SEE HPI  GENITOURINARY:  No dysuria, hematuria, urinary frequency  NEUROLOGICAL:  No headache, dizziness, syncope, paralysis, ataxia, numbness or tingling in the extremities. No change in bowel or bladder control.  MUSCULOSKELETAL:  No muscle, back pain, joint pain or stiffness.  HEMATOLOGIC:  No anemia, bleeding or bruising.  LYMPHATICS:  No enlarged nodes. No history of splenectomy.  PSYCHIATRIC:  No history of depression or anxiety.  ENDOCRINOLOGIC:  No reports of sweating, cold or heat intolerance. No polyuria or polydipsia.      ______________________________________________________________________  PHYSICAL EXAM:  T(C): 37.1 (10-25-24 @ 12:27), Max: 37.1 (10-25-24 @ 12:27)  HR: 92 (10-25-24 @ 12:27)  BP: 174/86 (10-25-24 @ 12:27)  RR: 18 (10-25-24 @ 12:27)  SpO2: 98% (10-25-24 @ 12:27)  Wt(kg): --      GEN: NAD, normocephalic  CVS: S1S2+  CHEST: clear to auscultation  ABD: soft , nontender, nondistended, bowel sounds present  EXTR: no cyanosis, no clubbing, no edema  NEURO: Awake and alert  SKIN:  warm;  no icteric    ______________________________________________________________________  LABS:                        13.8   5.91  )-----------( 85       ( 25 Oct 2024 07:45 )             40.7     10-25    134[L]  |  103  |  7   ----------------------------<  165[H]  4.2   |  29  |  0.92    Ca    8.8      25 Oct 2024 07:45  Phos  2.4     10-25  Mg     1.7     10-25    TPro  5.6[L]  /  Alb  2.8[L]  /  TBili  0.7  /  DBili  x   /  AST  15  /  ALT  17  /  AlkPhos  66  10-25    LIVER FUNCTIONS - ( 25 Oct 2024 07:45 )  Alb: 2.8 g/dL / Pro: 5.6 g/dL / ALK PHOS: 66 U/L / ALT: 17 U/L DA / AST: 15 U/L / GGT: x             ____________________________________________    IMAGING:      < from: CT Abdomen and Pelvis w/ IV Cont (10.24.24 @ 15:07) >  IMPRESSION:  1.  No pulmonary embolism.  2.  Small right lower lobe consolidation is suspicious for pneumonia.  3.  New severe wall thickening of esophagus with surrounding   infiltration, either esophagitis or inflammation related to recent   vomiting.    < end of copied text >     [facility] GI CONSULTATION    Patient is a 79y old  Male who presents with a chief complaint of Weakness (25 Oct 2024 09:06)    HPI:  79M PMHx HIV, Stage 4 colon CA w/ liver mets and HLD presented to the ED with c/o epigastric pain and generalized weakness for 1 day. Patient last received chemo 1 week ago. Patient stated the pain is similar to previous pain but more severe. It is non radiating, not associated with food intake, inhalation or position. Patient denies any fever, chills, dizziness ,headache, SOB, cough, palpitations, nausea, vomiting, diarrhea, abdominal pain, urinary symptoms, lower extremity pain, or edema. Patient denies recent travel or sick contacts.  (24 Oct 2024 19:57)      GI HPI:  Resting comfortably in bed.  Denies nausea, vomiting or bloody stools.  Has epigastric pain with food intake but denies dysphagia.       PMH/PSH:  PAST MEDICAL & SURGICAL HISTORY:  HIV positive      Hyperlipidemia      History of colon cancer      History of tonsillectomy        FH:  FAMILY HISTORY:      MEDS:  MEDICATIONS  (STANDING):  ascorbic acid 500 milliGRAM(s) Oral daily  aspirin  chewable 81 milliGRAM(s) Oral daily  atorvastatin 10 milliGRAM(s) Oral at bedtime  azithromycin  IVPB 500 milliGRAM(s) IV Intermittent every 24 hours  cefTRIAXone   IVPB 1000 milliGRAM(s) IV Intermittent every 24 hours  dolutegravir 50 milliGRAM(s) Oral daily  ferrous    sulfate 325 milliGRAM(s) Oral daily  heparin   Injectable 5000 Unit(s) SubCutaneous every 8 hours  influenza  Vaccine (HIGH DOSE) 0.5 milliLiter(s) IntraMuscular once  sodium chloride 0.9%. 1000 milliLiter(s) (100 mL/Hr) IV Continuous <Continuous>    MEDICATIONS  (PRN):  acetaminophen     Tablet .. 650 milliGRAM(s) Oral every 6 hours PRN Temp greater or equal to 38C (100.4F), Mild Pain (1 - 3)  aluminum hydroxide/magnesium hydroxide/simethicone Suspension 30 milliLiter(s) Oral every 4 hours PRN Dyspepsia  melatonin 3 milliGRAM(s) Oral at bedtime PRN Insomnia  ondansetron Injectable 4 milliGRAM(s) IV Push every 8 hours PRN Nausea and/or Vomiting    Allergies    No Known Allergies    Intolerances            CONSTITUTIONAL:  No weight loss, fever, chills, weakness or fatigue.  HEENT:  Eyes:  No visual loss, blurred vision, double vision or yellow sclerae. Ears, Nose, Throat:  No hearing loss, sneezing, congestion, runny nose or sore throat.  SKIN:  No rash or itching.  CARDIOVASCULAR:  No chest pain, chest pressure or chest discomfort. No palpitations or edema.  RESPIRATORY:  No shortness of breath, cough or sputum.  GASTROINTESTINAL:  SEE HPI  GENITOURINARY:  No dysuria, hematuria, urinary frequency  NEUROLOGICAL:  No headache, dizziness, syncope, paralysis, ataxia, numbness or tingling in the extremities. No change in bowel or bladder control.  MUSCULOSKELETAL:  No muscle, back pain, joint pain or stiffness.  HEMATOLOGIC:  No anemia, bleeding or bruising.  LYMPHATICS:  No enlarged nodes. No history of splenectomy.  PSYCHIATRIC:  No history of depression or anxiety.  ENDOCRINOLOGIC:  No reports of sweating, cold or heat intolerance. No polyuria or polydipsia.      ______________________________________________________________________  PHYSICAL EXAM:  T(C): 37.1 (10-25-24 @ 12:27), Max: 37.1 (10-25-24 @ 12:27)  HR: 92 (10-25-24 @ 12:27)  BP: 174/86 (10-25-24 @ 12:27)  RR: 18 (10-25-24 @ 12:27)  SpO2: 98% (10-25-24 @ 12:27)  Wt(kg): --      GEN: NAD, normocephalic  CVS: S1S2+  CHEST: clear to auscultation  ABD: soft , nontender, nondistended, bowel sounds present  EXTR: no cyanosis, no clubbing, no edema  NEURO: Awake and alert  SKIN:  warm;  no icteric    ______________________________________________________________________  LABS:                        13.8   5.91  )-----------( 85       ( 25 Oct 2024 07:45 )             40.7     10-25    134[L]  |  103  |  7   ----------------------------<  165[H]  4.2   |  29  |  0.92    Ca    8.8      25 Oct 2024 07:45  Phos  2.4     10-25  Mg     1.7     10-25    TPro  5.6[L]  /  Alb  2.8[L]  /  TBili  0.7  /  DBili  x   /  AST  15  /  ALT  17  /  AlkPhos  66  10-25    LIVER FUNCTIONS - ( 25 Oct 2024 07:45 )  Alb: 2.8 g/dL / Pro: 5.6 g/dL / ALK PHOS: 66 U/L / ALT: 17 U/L DA / AST: 15 U/L / GGT: x             ____________________________________________    IMAGING:      < from: CT Abdomen and Pelvis w/ IV Cont (10.24.24 @ 15:07) >  IMPRESSION:  1.  No pulmonary embolism.  2.  Small right lower lobe consolidation is suspicious for pneumonia.  3.  New severe wall thickening of esophagus with surrounding   infiltration, either esophagitis or inflammation related to recent   vomiting.    < end of copied text >

## 2024-10-25 NOTE — PROGRESS NOTE ADULT - SUBJECTIVE AND OBJECTIVE BOX
PGY-2 Progress Note discussed with attending    Contact: Generate Teams    PLEASE CONTACT ON CALL TEAM:  - On Call Team (Please refer to Luz) FROM 5:00 PM - 8:30PM  - Nightfloat Team FROM 8:30 -7:30 AM    CC: Patient is a 79y old  Male who presents with a chief complaint of Weakness (24 Oct 2024 19:57)      OVERNIGHT EVENTS:    SUBJECTIVE / INTERVAL HPI: Patient seen and examined at bedside.     ROS:  CONSTITUTIONAL: No weakness, fevers or chills  EYES/ENT: No visual changes;  No vertigo or throat pain   NECK: No pain or stiffness  RESPIRATORY: No cough, wheezing, hemoptysis; No shortness of breath  CARDIOVASCULAR: No chest pain or palpitations  GASTROINTESTINAL: No abdominal or epigastric pain. No nausea, vomiting, or hematemesis; No diarrhea or constipation. No melena or hematochezia.  GENITOURINARY: No dysuria, frequency or hematuria  NEUROLOGICAL: No numbness or weakness  SKIN: No itching, burning, rashes, or lesions     VITAL SIGNS:  Vital Signs Last 24 Hrs  T(C): 36.3 (25 Oct 2024 06:15), Max: 36.9 (24 Oct 2024 12:32)  T(F): 97.4 (25 Oct 2024 06:15), Max: 98.5 (24 Oct 2024 19:35)  HR: 83 (25 Oct 2024 06:15) (74 - 107)  BP: 150/84 (25 Oct 2024 06:15) (150/84 - 184/102)  BP(mean): 106 (25 Oct 2024 06:15) (106 - 106)  RR: 18 (25 Oct 2024 06:15) (18 - 18)  SpO2: 96% (25 Oct 2024 06:15) (96% - 99%)    Parameters below as of 25 Oct 2024 06:15  Patient On (Oxygen Delivery Method): room air        PHYSICAL EXAM:    General: WDWN  HEENT: NC/AT; PERRL, anicteric sclera; MMM  Neck: supple  Cardiovascular: +S1/S2; RRR  Respiratory: CTA B/L; no W/R/R  Gastrointestinal: soft, NT/ND; +BSx4  Extremities: WWP; no edema, clubbing or cyanosis  Vascular: 2+ radial, DP/PT pulses B/L  Skin: Warm, dry, good turgor, no rashes, or ecchymoses  Neurological: AAOx3; no focal deficits    MEDICATIONS:  MEDICATIONS  (STANDING):  ascorbic acid 500 milliGRAM(s) Oral daily  aspirin  chewable 81 milliGRAM(s) Oral daily  atorvastatin 10 milliGRAM(s) Oral at bedtime  azithromycin  IVPB 500 milliGRAM(s) IV Intermittent every 24 hours  cefTRIAXone   IVPB 1000 milliGRAM(s) IV Intermittent every 24 hours  dolutegravir 50 milliGRAM(s) Oral daily  ferrous    sulfate 325 milliGRAM(s) Oral daily  heparin   Injectable 5000 Unit(s) SubCutaneous every 8 hours  influenza  Vaccine (HIGH DOSE) 0.5 milliLiter(s) IntraMuscular once  sodium chloride 0.9%. 1000 milliLiter(s) (100 mL/Hr) IV Continuous <Continuous>    MEDICATIONS  (PRN):  acetaminophen     Tablet .. 650 milliGRAM(s) Oral every 6 hours PRN Temp greater or equal to 38C (100.4F), Mild Pain (1 - 3)  aluminum hydroxide/magnesium hydroxide/simethicone Suspension 30 milliLiter(s) Oral every 4 hours PRN Dyspepsia  melatonin 3 milliGRAM(s) Oral at bedtime PRN Insomnia  ondansetron Injectable 4 milliGRAM(s) IV Push every 8 hours PRN Nausea and/or Vomiting      ALLERGIES:  Allergies    No Known Allergies    Intolerances        LABS:                        13.8   5.91  )-----------( 85       ( 25 Oct 2024 07:45 )             40.7     10-25    134[L]  |  103  |  7   ----------------------------<  165[H]  4.2   |  29  |  0.92    Ca    8.8      25 Oct 2024 07:45  Phos  2.4     10-25  Mg     1.7     10-25    TPro  5.6[L]  /  Alb  2.8[L]  /  TBili  0.7  /  DBili  x   /  AST  15  /  ALT  17  /  AlkPhos  66  10-25      Urinalysis Basic - ( 25 Oct 2024 07:45 )    Color: x / Appearance: x / SG: x / pH: x  Gluc: 165 mg/dL / Ketone: x  / Bili: x / Urobili: x   Blood: x / Protein: x / Nitrite: x   Leuk Esterase: x / RBC: x / WBC x   Sq Epi: x / Non Sq Epi: x / Bacteria: x      CAPILLARY BLOOD GLUCOSE          RADIOLOGY & ADDITIONAL TESTS:   < from: CT Abdomen and Pelvis w/ IV Cont (10.24.24 @ 15:07) >  FINDINGS:  CHEST:  LUNGS AND LARGE AIRWAYS: Patent central airways. Emphysema. Right lower   lobe consolidation suspicious for pneumonia.  PLEURA: No pulmonary embolism.  VESSELS: Within normal limits.  HEART: Heart size is normal. No pericardial effusion.  MEDIASTINUM AND OLI: Severe esophageal wall thickening with surrounding   fatty infiltration.  CHEST WALL AND LOWER NECK: Right chest wall port with catheter tip   terminating in the SVC.    ABDOMEN AND PELVIS:  LIVER: The subcentimeter right hepatic lobe lesion seen on MRI is not   visualized on CT. No new liver lesion  BILE DUCTS: Normal caliber.  GALLBLADDER: Within normal limits.  SPLEEN: Within normal limits.  PANCREAS: Within normal limits.  ADRENALS: Within normal limits.  KIDNEYS/URETERS: No hydronephrosis. Renal cysts.    BLADDER: Distended.  REPRODUCTIVE ORGANS: Enlarged prostate.    BOWEL: No bowel obstruction.  PERITONEUM/RETROPERITONEUM: Within normal limits.  VESSELS: Atherosclerotic change.  LYMPH NODES: No lymphadenopathy.  ABDOMINAL WALL: Small fat-containing umbilical hernia.  BONES: Degenerative changes of the spine.    IMPRESSION:  1.  No pulmonary embolism.  2.  Small right lower lobe consolidation is suspicious for pneumonia.  3.  New severe wall thickening of esophagus with surrounding   infiltration, either esophagitis or inflammation related to recent   vomiting.    < end of copied text >   PGY-2 Progress Note discussed with attending    Contact: Friend Traveler Teams    PLEASE CONTACT ON CALL TEAM:  - On Call Team (Please refer to Luz) FROM 5:00 PM - 8:30PM  - Nightfloat Team FROM 8:30 -7:30 AM    CC: Patient is a 79y old  Male who presents with a chief complaint of Weakness (24 Oct 2024 19:57)      OVERNIGHT EVENTS: admit to 4S    SUBJECTIVE / INTERVAL HPI: Patient seen and examined at bedside. Complaining of pressure like sensation described as food stuck in the middle of his chest. States that he is having difficulty swallowing solid food. States that the one episode of vomiting yesterday was due to this overwhelming pressure sensation and not associated with abdominal pain or nausea. Denies abdominal pain, palpitations, nausea, vomiting. fever, chills, shortness of breath. Describes significant work environmental exposures up until snf in 2016. 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     ROS:  CONSTITUTIONAL: +weakness, no fevers or chills  EYES/ENT: No visual changes;  No vertigo or throat pain   NECK: No pain or stiffness  RESPIRATORY: No cough, wheezing, hemoptysis; No shortness of breath  CARDIOVASCULAR: No chest pain or palpitations  GASTROINTESTINAL: +globus sensation, +difficulty swallowing solids, No abdominal or epigastric pain. No nausea, vomiting, or hematemesis; No diarrhea or constipation. No melena or hematochezia.  GENITOURINARY: No dysuria, frequency or hematuria  NEUROLOGICAL: No numbness or weakness  SKIN: No itching, burning, rashes, or lesions     VITAL SIGNS:  Vital Signs Last 24 Hrs  T(C): 36.3 (25 Oct 2024 06:15), Max: 36.9 (24 Oct 2024 12:32)  T(F): 97.4 (25 Oct 2024 06:15), Max: 98.5 (24 Oct 2024 19:35)  HR: 83 (25 Oct 2024 06:15) (74 - 107)  BP: 150/84 (25 Oct 2024 06:15) (150/84 - 184/102)  BP(mean): 106 (25 Oct 2024 06:15) (106 - 106)  RR: 18 (25 Oct 2024 06:15) (18 - 18)  SpO2: 96% (25 Oct 2024 06:15) (96% - 99%)    Parameters below as of 25 Oct 2024 06:15  Patient On (Oxygen Delivery Method): room air        PHYSICAL EXAM:    General: WDWN  HEENT: NC/AT; PERRL, anicteric sclera; MMM  Neck: supple  Cardiovascular: +S1/S2; RRR  Respiratory: CTA B/L; no W/R/R  Gastrointestinal: soft, NT/ND; +BSx4  Extremities: WWP; no edema, clubbing or cyanosi  Vascular: 2+ radial, DP/PT pulses B/L  Skin: Warm, dry, good turgor, no rashes, or ecchymoses, chemo changes to b/l palms with darkened rough skin  Neurological: AAOx3; no focal deficits    MEDICATIONS:  MEDICATIONS  (STANDING):  ascorbic acid 500 milliGRAM(s) Oral daily  aspirin  chewable 81 milliGRAM(s) Oral daily  atorvastatin 10 milliGRAM(s) Oral at bedtime  azithromycin  IVPB 500 milliGRAM(s) IV Intermittent every 24 hours  cefTRIAXone   IVPB 1000 milliGRAM(s) IV Intermittent every 24 hours  dolutegravir 50 milliGRAM(s) Oral daily  ferrous    sulfate 325 milliGRAM(s) Oral daily  heparin   Injectable 5000 Unit(s) SubCutaneous every 8 hours  influenza  Vaccine (HIGH DOSE) 0.5 milliLiter(s) IntraMuscular once  sodium chloride 0.9%. 1000 milliLiter(s) (100 mL/Hr) IV Continuous <Continuous>    MEDICATIONS  (PRN):  acetaminophen     Tablet .. 650 milliGRAM(s) Oral every 6 hours PRN Temp greater or equal to 38C (100.4F), Mild Pain (1 - 3)  aluminum hydroxide/magnesium hydroxide/simethicone Suspension 30 milliLiter(s) Oral every 4 hours PRN Dyspepsia  melatonin 3 milliGRAM(s) Oral at bedtime PRN Insomnia  ondansetron Injectable 4 milliGRAM(s) IV Push every 8 hours PRN Nausea and/or Vomiting      ALLERGIES:  Allergies    No Known Allergies    Intolerances        LABS:                        13.8   5.91  )-----------( 85       ( 25 Oct 2024 07:45 )             40.7     10-25    134[L]  |  103  |  7   ----------------------------<  165[H]  4.2   |  29  |  0.92    Ca    8.8      25 Oct 2024 07:45  Phos  2.4     10-25  Mg     1.7     10-25    TPro  5.6[L]  /  Alb  2.8[L]  /  TBili  0.7  /  DBili  x   /  AST  15  /  ALT  17  /  AlkPhos  66  10-25      Urinalysis Basic - ( 25 Oct 2024 07:45 )    Color: x / Appearance: x / SG: x / pH: x  Gluc: 165 mg/dL / Ketone: x  / Bili: x / Urobili: x   Blood: x / Protein: x / Nitrite: x   Leuk Esterase: x / RBC: x / WBC x   Sq Epi: x / Non Sq Epi: x / Bacteria: x      CAPILLARY BLOOD GLUCOSE          RADIOLOGY & ADDITIONAL TESTS:   < from: CT Abdomen and Pelvis w/ IV Cont (10.24.24 @ 15:07) >  FINDINGS:  CHEST:  LUNGS AND LARGE AIRWAYS: Patent central airways. Emphysema. Right lower   lobe consolidation suspicious for pneumonia.  PLEURA: No pulmonary embolism.  VESSELS: Within normal limits.  HEART: Heart size is normal. No pericardial effusion.  MEDIASTINUM AND OLI: Severe esophageal wall thickening with surrounding   fatty infiltration.  CHEST WALL AND LOWER NECK: Right chest wall port with catheter tip   terminating in the SVC.    ABDOMEN AND PELVIS:  LIVER: The subcentimeter right hepatic lobe lesion seen on MRI is not   visualized on CT. No new liver lesion  BILE DUCTS: Normal caliber.  GALLBLADDER: Within normal limits.  SPLEEN: Within normal limits.  PANCREAS: Within normal limits.  ADRENALS: Within normal limits.  KIDNEYS/URETERS: No hydronephrosis. Renal cysts.    BLADDER: Distended.  REPRODUCTIVE ORGANS: Enlarged prostate.    BOWEL: No bowel obstruction.  PERITONEUM/RETROPERITONEUM: Within normal limits.  VESSELS: Atherosclerotic change.  LYMPH NODES: No lymphadenopathy.  ABDOMINAL WALL: Small fat-containing umbilical hernia.  BONES: Degenerative changes of the spine.    IMPRESSION:  1.  No pulmonary embolism.  2.  Small right lower lobe consolidation is suspicious for pneumonia.  3.  New severe wall thickening of esophagus with surrounding   infiltration, either esophagitis or inflammation related to recent   vomiting.    < end of copied text >

## 2024-10-25 NOTE — PROGRESS NOTE ADULT - ATTENDING COMMENTS
CC:    Chest pain   S:   patient states he feels that whenever he swallows, it gets stuck midchest area  O:   Vital Signs Last 24 Hrs  T(C): 37.1 (10-25-24 @ 12:27), Max: 37.1 (10-25-24 @ 12:27)  T(F): 98.7 (10-25-24 @ 12:27), Max: 98.7 (10-25-24 @ 12:27)  HR: 92 (10-25-24 @ 12:27) (74 - 99)  BP: 174/86 (10-25-24 @ 12:27) (150/84 - 184/102)  BP(mean): 106 (10-25-24 @ 06:15) (106 - 106)  RR: 18 (10-25-24 @ 12:27) (18 - 18)  SpO2: 98% (10-25-24 @ 12:27) (96% - 98%)  PE:   Gen: Oriented, alert, No acute distress Eyes: conjunctivae and lid normal, sclera clear with no icterus ENT: nose and throat exam normal CVS: S1, S2, RRR; no murmur, no rubs, no gallops Pulm: Good air exchange, Breath sounds equal bilaterally, no rales rhonchi,  no wheezes Chest: nontender, no chest deformity, chest movement symmetrical Gl: abdomen soft, nontender, nondistended, bowel sounds normoactive, no masses palpated Musk: no msk pain, no joint swelling Skin: no skin lesions, skin turgor normal, warm and well perfused Neuro: Awake, alert,Psych: normal affect, insight      A/P:   Mr. Zavala is a 78 yo man with PMH significant for HIV/AIDS, Hepatitis B, iron deficiecny anemia, adenoCA of colon (followed by [facility]) who presents with R sided chest pain found to have RLL pneumonia.      #RLL Pneumonia   #Esophagitis   #AdenoCA of Colon   #HIV/AIDS   #Hepatitis B   #MAISHA   #Thrombocytopenia   #Hyponatremia   -will start IV ceftriaxone, azithromycin   -start IV fluids   -send legionella, MRSA, mycoplasma, strep pneumoniae, sputum cx   -nutrition consult   -History also obtained from wife.   -Consult heme onc [facility] as patient follows them. Started FOLFOX since 7/2024   -Continue medications: atorvastatin 10 mg tablet,  Aspirin 81 mg , Calcium 600 + D(3) 600 mg­400 unit Tab, clopidogrel 75 mg tablet, Tivicay , complegte med rec  -obtain HIV viral load   -monitor CBC   -f/u blood cx   -f/u UA   -trend LFTs   -DVT ppx: lovenox      Labs reviewed:    WBC 3.58 H/H 14.9/43.7 Plt 93 DDimer 2538   Na 132 K4.6 Cl 100 BUN 7 Creatinine 0.9 Alb 3.2 Bili 1.3 AST/ALT 17/21 Lactate 1.8 Lipase 29 Trop 45 ProBNP 1210 Mag 1.7 CK 39   Imaging reviewed:    CTA Chest, abdomen   1.  No pulmonary embolism.   2.  Small right lower lobe consolidation is suspicious for pneumonia.   3.  New severe wall thickening of esophagus with surrounding    infiltration, either esophagitis or inflammation related to recent    vomiting. CC:    Chest pain   S:   patient states he feels that whenever he swallows, it gets stuck midchest area  O:   Vital Signs Last 24 Hrs  T(C): 37.1 (10-25-24 @ 12:27), Max: 37.1 (10-25-24 @ 12:27)  T(F): 98.7 (10-25-24 @ 12:27), Max: 98.7 (10-25-24 @ 12:27)  HR: 92 (10-25-24 @ 12:27) (74 - 99)  BP: 174/86 (10-25-24 @ 12:27) (150/84 - 184/102)  BP(mean): 106 (10-25-24 @ 06:15) (106 - 106)  RR: 18 (10-25-24 @ 12:27) (18 - 18)  SpO2: 98% (10-25-24 @ 12:27) (96% - 98%)  PE:   Gen: Oriented, alert, No acute distress Eyes: conjunctivae and lid normal, sclera clear with no icterus ENT: nose and throat exam normal CVS: S1, S2, RRR; no murmur, no rubs, no gallops Pulm: Good air exchange, Breath sounds equal bilaterally, no rales rhonchi,  no wheezes Chest: nontender, no chest deformity, chest movement symmetrical Gl: abdomen soft, nontender, nondistended, bowel sounds normoactive, no masses palpated Musk: no msk pain, no joint swelling Skin: no skin lesions, skin turgor normal, warm and well perfused Neuro: Awake, alert,Psych: normal affect, insight      A/P:   Mr. Zavala is a 80 yo man with PMH significant for HIV/AIDS, Hepatitis B, iron deficiecny anemia, adenoCA of colon (followed by [facility]) who presents with R sided chest pain found to have RLL pneumonia.      #RLL Pneumonia   #Esophagitis   #AdenoCA of Colon   #HIV/AIDS   #Hepatitis B   #MAISHA   #Thrombocytopenia   #Hyponatremia   -continue IV ceftriaxone, azithromycin   -cont IV fluids   -send legionella, MRSA, mycoplasma, strep pneumoniae, sputum cx   -nutrition consult   -History also obtained from wife.   -Consult heme onc [facility] as patient follows them. Started FOLFOX since 7/2024   -GI consulted, discussed, continue PPI and start lidocaine swish and swallow, if not improvement, may need EGD with esophageal biopsy   -Continue medications: atorvastatin 10 mg tablet,  Aspirin 81 mg , Calcium 600 + D(3) 600 mg­400 unit Tab, clopidogrel 75 mg tablet, Tivicay , complegte med rec  -f/u HIV viral load , cd4 count  -monitor CBC   -f/u blood cx   -f/u UA   -trend LFTs   -DVT ppx: lovenox      Labs reviewed:                          13.8   5.91  )-----------( 85       ( 25 Oct 2024 07:45 )             40.7   10-25    134[L]  |  103  |  7   ----------------------------<  165[H]  4.2   |  29  |  0.92    Ca    8.8      25 Oct 2024 07:45  Phos  2.4     10-25  Mg     1.7     10-25    TPro  5.6[L]  /  Alb  2.8[L]  /  TBili  0.7  /  DBili  x   /  AST  15  /  ALT  17  /  AlkPhos  66  10-25    Imaging reviewed:    CTA Chest, abdomen   1.  No pulmonary embolism.   2.  Small right lower lobe consolidation is suspicious for pneumonia.   3.  New severe wall thickening of esophagus with surrounding    infiltration, either esophagitis or inflammation related to recent    vomiting.

## 2024-10-25 NOTE — CONSULT NOTE ADULT - ASSESSMENT
79-year-old male with history of HIV, last viral load undetected in July, HLD, colon CA diagnosed 7/24 with liver mets.    #Dysphagia  ISO recent chemo therapy treatment  PPI BID  Soft & bite sized diet  Lidocaine s/s    Thank you for this consult!  This note and its recommendations herein are preliminary until such time as cosigned by an attending.    GI will sign off at this time.  Thank you for involving us in the care of Mr. Zavala  Please re-consult GI PRN.  79-year-old male with history of HIV, last viral load undetected in July, HLD, colon CA diagnosed 7/24 with liver mets.    #Epigastric pain  Denies dysphagia. Only has epigastric pain with oral intake.  ISO recent chemo therapy treatment  PPI BID  Soft & bite sized diet  Lidocaine swish and swallow    Thank you for this consult!  This note and its recommendations herein are preliminary until such time as cosigned by an attending.    GI will sign off at this time.  Thank you for involving us in the care of Mr. Zavala  Please re-consult GI PRN.

## 2024-10-25 NOTE — PROGRESS NOTE ADULT - PROBLEM SELECTOR PLAN 2
Plt 93  bilirubin elevated but no schistocytes on peripheral smear  possibly ISO chemo vs acute infection p/w globus sensation and 1 episode of associated vomiting  -unable to tolerate regular diet  -CT chest shows severe esophageal wall thickening with surrounding fatty infiltration  -GI consulted   -switch to trial of soft and bite sized diet  -start PPI BID  -may need EGD with biopsy if not improving

## 2024-10-25 NOTE — PROGRESS NOTE ADULT - PROBLEM SELECTOR PLAN 1
CT chest showing RLL consolidation  s/p ceftraixone and azithromycin in the ED  f/u legionella, mycoplasma,strep pneumo  f/u sputum Cx, BCx  c/w Ceftriaxone+ Azithromycin CT chest showing Emphysema and right lower lobe consolidation suspicious for pneumonia.  -s/p ceftraixone and azithromycin in the ED  -f/u legionella, mycoplasma,strep pneumo  -f/u sputum Cx, BCx  -c/w Ceftriaxone+ Azithromycin

## 2024-10-26 LAB
ANION GAP SERPL CALC-SCNC: 8 MMOL/L — SIGNIFICANT CHANGE UP (ref 5–17)
APTT BLD: 36.7 SEC — HIGH (ref 24.5–35.6)
BUN SERPL-MCNC: 7 MG/DL — SIGNIFICANT CHANGE UP (ref 7–18)
CALCIUM SERPL-MCNC: 9 MG/DL — SIGNIFICANT CHANGE UP (ref 8.4–10.5)
CHLORIDE SERPL-SCNC: 102 MMOL/L — SIGNIFICANT CHANGE UP (ref 96–108)
CO2 SERPL-SCNC: 28 MMOL/L — SIGNIFICANT CHANGE UP (ref 22–31)
CREAT SERPL-MCNC: 0.8 MG/DL — SIGNIFICANT CHANGE UP (ref 0.5–1.3)
EGFR: 91 ML/MIN/1.73M2 — SIGNIFICANT CHANGE UP
FIBRINOGEN PPP-MCNC: 460 MG/DL — SIGNIFICANT CHANGE UP (ref 200–475)
FOLATE SERPL-MCNC: >20 NG/ML — SIGNIFICANT CHANGE UP
GLUCOSE SERPL-MCNC: 142 MG/DL — HIGH (ref 70–99)
HCT VFR BLD CALC: 40.6 % — SIGNIFICANT CHANGE UP (ref 39–50)
HGB BLD-MCNC: 14.2 G/DL — SIGNIFICANT CHANGE UP (ref 13–17)
HIV-1 VIRAL LOAD RESULT: SIGNIFICANT CHANGE UP
HIV1 RNA # SERPL NAA+PROBE: SIGNIFICANT CHANGE UP COPIES/ML
HIV1 RNA SER-IMP: SIGNIFICANT CHANGE UP
HIV1 RNA SERPL NAA+PROBE-ACNC: SIGNIFICANT CHANGE UP
HIV1 RNA SERPL NAA+PROBE-LOG#: SIGNIFICANT CHANGE UP LG COP/ML
INR BLD: 1.25 RATIO — HIGH (ref 0.85–1.16)
LEGIONELLA AG UR QL: NEGATIVE — SIGNIFICANT CHANGE UP
MAGNESIUM SERPL-MCNC: 1.9 MG/DL — SIGNIFICANT CHANGE UP (ref 1.6–2.6)
MCHC RBC-ENTMCNC: 30.8 PG — SIGNIFICANT CHANGE UP (ref 27–34)
MCHC RBC-ENTMCNC: 35 GM/DL — SIGNIFICANT CHANGE UP (ref 32–36)
MCV RBC AUTO: 88.1 FL — SIGNIFICANT CHANGE UP (ref 80–100)
NRBC # BLD: 0 /100 WBCS — SIGNIFICANT CHANGE UP (ref 0–0)
PHOSPHATE SERPL-MCNC: 2.6 MG/DL — SIGNIFICANT CHANGE UP (ref 2.5–4.5)
PLATELET # BLD AUTO: 86 K/UL — LOW (ref 150–400)
POTASSIUM SERPL-MCNC: 3.4 MMOL/L — LOW (ref 3.5–5.3)
POTASSIUM SERPL-SCNC: 3.4 MMOL/L — LOW (ref 3.5–5.3)
PROTHROM AB SERPL-ACNC: 14.6 SEC — HIGH (ref 9.9–13.4)
RBC # BLD: 4.61 M/UL — SIGNIFICANT CHANGE UP (ref 4.2–5.8)
RBC # FLD: 17.9 % — HIGH (ref 10.3–14.5)
S PNEUM AG UR QL: NEGATIVE — SIGNIFICANT CHANGE UP
SODIUM SERPL-SCNC: 138 MMOL/L — SIGNIFICANT CHANGE UP (ref 135–145)
VIT B12 SERPL-MCNC: 972 PG/ML — SIGNIFICANT CHANGE UP (ref 232–1245)
WBC # BLD: 7.91 K/UL — SIGNIFICANT CHANGE UP (ref 3.8–10.5)
WBC # FLD AUTO: 7.91 K/UL — SIGNIFICANT CHANGE UP (ref 3.8–10.5)

## 2024-10-26 PROCEDURE — 99232 SBSQ HOSP IP/OBS MODERATE 35: CPT

## 2024-10-26 RX ORDER — LIDOCAINE HYDROCHLORIDE 40 MG/ML
10 SOLUTION TOPICAL DAILY
Refills: 0 | Status: DISCONTINUED | OUTPATIENT
Start: 2024-10-26 | End: 2024-10-29

## 2024-10-26 RX ORDER — POTASSIUM CHLORIDE 10 MEQ
20 TABLET, EXTENDED RELEASE ORAL ONCE
Refills: 0 | Status: COMPLETED | OUTPATIENT
Start: 2024-10-26 | End: 2024-10-26

## 2024-10-26 RX ADMIN — Medication 20 MILLIEQUIVALENT(S): at 14:59

## 2024-10-26 RX ADMIN — Medication 500 MILLIGRAM(S): at 12:03

## 2024-10-26 RX ADMIN — Medication 650 MILLIGRAM(S): at 03:42

## 2024-10-26 RX ADMIN — Medication 325 MILLIGRAM(S): at 12:03

## 2024-10-26 RX ADMIN — DOLUTEGRAVIR SODIUM 50 MILLIGRAM(S): 10 TABLET, FILM COATED ORAL at 12:03

## 2024-10-26 RX ADMIN — LIDOCAINE HYDROCHLORIDE 10 MILLILITER(S): 40 SOLUTION TOPICAL at 14:59

## 2024-10-26 RX ADMIN — Medication 40 MILLIGRAM(S): at 22:07

## 2024-10-26 RX ADMIN — Medication 10 MILLIGRAM(S): at 22:06

## 2024-10-26 RX ADMIN — PANTOPRAZOLE SODIUM 40 MILLIGRAM(S): 40 TABLET, DELAYED RELEASE ORAL at 17:25

## 2024-10-26 RX ADMIN — Medication 30 MILLILITER(S): at 03:43

## 2024-10-26 RX ADMIN — Medication 650 MILLIGRAM(S): at 04:42

## 2024-10-26 RX ADMIN — Medication 81 MILLIGRAM(S): at 12:03

## 2024-10-26 RX ADMIN — PANTOPRAZOLE SODIUM 40 MILLIGRAM(S): 40 TABLET, DELAYED RELEASE ORAL at 06:41

## 2024-10-26 RX ADMIN — AZITHROMYCIN DIHYDRATE 255 MILLIGRAM(S): 200 POWDER, FOR SUSPENSION ORAL at 06:41

## 2024-10-26 RX ADMIN — Medication 100 MILLIGRAM(S): at 23:44

## 2024-10-26 NOTE — PROGRESS NOTE ADULT - SUBJECTIVE AND OBJECTIVE BOX
CC:    Chest pain   S:   patient still has some difficulty with swallowing  O:   Vital Signs Last 24 Hrs  T(C): 36.6 (10-26-24 @ 12:03), Max: 36.8 (10-26-24 @ 05:05)  T(F): 97.9 (10-26-24 @ 12:03), Max: 98.2 (10-26-24 @ 05:05)  HR: 88 (10-26-24 @ 12:03) (83 - 90)  BP: 177/95 (10-26-24 @ 12:03) (149/77 - 177/95)  BP(mean): 101 (10-26-24 @ 05:05) (101 - 101)  RR: 17 (10-26-24 @ 12:03) (17 - 18)  SpO2: 96% (10-26-24 @ 12:03) (95% - 96%)      PE:   Gen: Oriented, alert, No acute distress Eyes: conjunctivae and lid normal, sclera clear with no icterus ENT: nose and throat exam normal CVS: S1, S2, RRR; no murmur, no rubs, no gallops Pulm: Good air exchange, Breath sounds equal bilaterally, no rales rhonchi,  no wheezes Chest: nontender, no chest deformity, chest movement symmetrical Gl: abdomen soft, nontender, nondistended, bowel sounds normoactive, no masses palpated Musk: no msk pain, no joint swelling Skin: no skin lesions, skin turgor normal, warm and well perfused Neuro: Awake, alert,Psych: normal affect, insight

## 2024-10-26 NOTE — PROGRESS NOTE ADULT - ASSESSMENT
Mr. Zavala is a 80 yo man with PMH significant for HIV/AIDS, Hepatitis B, iron deficiecny anemia, adenoCA of colon (followed by [facility]) who presents with R sided chest pain found to have RLL pneumonia.      #RLL Pneumonia   #Esophagitis   #AdenoCA of Colon   #HIV/AIDS   #Hepatitis B   #MAISHA   #Thrombocytopenia   #Hypokalemia  -continue IV ceftriaxone  -cont IV fluids   -send legionella, MRSA, mycoplasma, strep pneumoniae, sputum cx   -nutrition consult   -History also obtained from wife.   -Consult heme onc [facility] as patient follows them. Started FOLFOX since 7/2024   -GI consulted, discussed, continue PPI and lidocaine swish and swallow, if not improvement, may need EGD with esophageal biopsy   -Continue medications: atorvastatin 10 mg tablet,  Aspirin 81 mg , Calcium 600 + D(3) 600 mg­400 unit Tab, clopidogrel 75 mg tablet, Tivicay , complegte med rec  -f/u HIV viral load , cd4 count reviewed, CD4 369  -monitor CBC   -f/u blood cx no growth  -f/u UA reviewed  -LFTs unremarkable   -replace potassium  -DVT ppx: lovenox      Labs reviewed:                               14.2   7.91  )-----------( 86       ( 26 Oct 2024 07:15 )             40.6   10-26    138  |  102  |  7   ----------------------------<  142[H]  3.4[L]   |  28  |  0.80    Ca    9.0      26 Oct 2024 07:15  Phos  2.6     10-26  Mg     1.9     10-26    TPro  5.6[L]  /  Alb  2.8[L]  /  TBili  0.7  /  DBili  x   /  AST  15  /  ALT  17  /  AlkPhos  66  10-25    Imaging reviewed:    CTA Chest, abdomen   1.  No pulmonary embolism.   2.  Small right lower lobe consolidation is suspicious for pneumonia.   3.  New severe wall thickening of esophagus with surrounding    infiltration, either esophagitis or inflammation related to recent    vomiting.

## 2024-10-27 LAB
ANION GAP SERPL CALC-SCNC: 5 MMOL/L — SIGNIFICANT CHANGE UP (ref 5–17)
BUN SERPL-MCNC: 6 MG/DL — LOW (ref 7–18)
CALCIUM SERPL-MCNC: 9 MG/DL — SIGNIFICANT CHANGE UP (ref 8.4–10.5)
CHLORIDE SERPL-SCNC: 104 MMOL/L — SIGNIFICANT CHANGE UP (ref 96–108)
CO2 SERPL-SCNC: 29 MMOL/L — SIGNIFICANT CHANGE UP (ref 22–31)
CREAT SERPL-MCNC: 0.8 MG/DL — SIGNIFICANT CHANGE UP (ref 0.5–1.3)
EGFR: 91 ML/MIN/1.73M2 — SIGNIFICANT CHANGE UP
GLUCOSE SERPL-MCNC: 117 MG/DL — HIGH (ref 70–99)
HCT VFR BLD CALC: 38.7 % — LOW (ref 39–50)
HGB BLD-MCNC: 13.7 G/DL — SIGNIFICANT CHANGE UP (ref 13–17)
MAGNESIUM SERPL-MCNC: 2 MG/DL — SIGNIFICANT CHANGE UP (ref 1.6–2.6)
MCHC RBC-ENTMCNC: 31.2 PG — SIGNIFICANT CHANGE UP (ref 27–34)
MCHC RBC-ENTMCNC: 35.4 GM/DL — SIGNIFICANT CHANGE UP (ref 32–36)
MCV RBC AUTO: 88.2 FL — SIGNIFICANT CHANGE UP (ref 80–100)
NRBC # BLD: 0 /100 WBCS — SIGNIFICANT CHANGE UP (ref 0–0)
PHOSPHATE SERPL-MCNC: 2.8 MG/DL — SIGNIFICANT CHANGE UP (ref 2.5–4.5)
PLATELET # BLD AUTO: 83 K/UL — LOW (ref 150–400)
POTASSIUM SERPL-MCNC: 3.6 MMOL/L — SIGNIFICANT CHANGE UP (ref 3.5–5.3)
POTASSIUM SERPL-SCNC: 3.6 MMOL/L — SIGNIFICANT CHANGE UP (ref 3.5–5.3)
RBC # BLD: 4.39 M/UL — SIGNIFICANT CHANGE UP (ref 4.2–5.8)
RBC # FLD: 18.3 % — HIGH (ref 10.3–14.5)
SODIUM SERPL-SCNC: 138 MMOL/L — SIGNIFICANT CHANGE UP (ref 135–145)
WBC # BLD: 7.29 K/UL — SIGNIFICANT CHANGE UP (ref 3.8–10.5)
WBC # FLD AUTO: 7.29 K/UL — SIGNIFICANT CHANGE UP (ref 3.8–10.5)

## 2024-10-27 PROCEDURE — 99232 SBSQ HOSP IP/OBS MODERATE 35: CPT

## 2024-10-27 RX ORDER — LIDOCAINE HYDROCHLORIDE 40 MG/ML
10 SOLUTION TOPICAL ONCE
Refills: 0 | Status: COMPLETED | OUTPATIENT
Start: 2024-10-27 | End: 2024-10-27

## 2024-10-27 RX ADMIN — Medication 500 MILLIGRAM(S): at 11:41

## 2024-10-27 RX ADMIN — Medication 10 MILLIGRAM(S): at 22:03

## 2024-10-27 RX ADMIN — Medication 325 MILLIGRAM(S): at 11:41

## 2024-10-27 RX ADMIN — DOLUTEGRAVIR SODIUM 50 MILLIGRAM(S): 10 TABLET, FILM COATED ORAL at 11:42

## 2024-10-27 RX ADMIN — PANTOPRAZOLE SODIUM 40 MILLIGRAM(S): 40 TABLET, DELAYED RELEASE ORAL at 05:50

## 2024-10-27 RX ADMIN — Medication 3 MILLIGRAM(S): at 22:06

## 2024-10-27 RX ADMIN — Medication 81 MILLIGRAM(S): at 11:41

## 2024-10-27 RX ADMIN — LIDOCAINE HYDROCHLORIDE 10 MILLILITER(S): 40 SOLUTION TOPICAL at 11:42

## 2024-10-27 RX ADMIN — LIDOCAINE HYDROCHLORIDE 10 MILLILITER(S): 40 SOLUTION TOPICAL at 22:04

## 2024-10-27 RX ADMIN — Medication 100 MILLIGRAM(S): at 23:18

## 2024-10-27 NOTE — PROGRESS NOTE ADULT - ASSESSMENT
Mr. Zavala is a 80 yo man with PMH significant for HIV/AIDS, Hepatitis B, iron deficiecny anemia, adenoCA of colon (followed by [facility]) who presents with R sided chest pain found to have RLL pneumonia.      #RLL Pneumonia   #Esophagitis   #AdenoCA of Colon   #HIV/AIDS   #Hepatitis B   #MAISHA   #Thrombocytopenia   -continue IV ceftriaxone  -cont IV fluids   -legionella neg, MRSA, mycoplasma, strep pneumoniae neg, sputum cx   -nutrition consult   -History also obtained from wife.   -Consult heme onc [facility] as patient follows them. Started FOLFOX since 7/2024   -GI consulted, discussed, continue PPI and lidocaine swish and swallow, if not improvement, may need EGD with esophageal biopsy , will make NPO for possible EGD tomorrow, will need to touch base with GI team  -Continue medications: atorvastatin 10 mg tablet,  Aspirin 81 mg , Calcium 600 + D(3) 600 mg­400 unit Tab, clopidogrel 75 mg tablet, Tivicay , complete med rec  -f/u HIV viral load not detected , cd4 count reviewed, CD4 369  -monitor CBC   -f/u blood cx no growth  -f/u UA reviewed  -LFTs unremarkable   -DVT ppx: lovenox      Labs reviewed:                           13.7   7.29  )-----------( 83       ( 27 Oct 2024 08:20 )             38.7   10-27    138  |  104  |  6[L]  ----------------------------<  117[H]  3.6   |  29  |  0.80    Ca    9.0      27 Oct 2024 08:20  Phos  2.8     10-27  Mg     2.0     10-27      Imaging reviewed:    CTA Chest, abdomen   1.  No pulmonary embolism.   2.  Small right lower lobe consolidation is suspicious for pneumonia.   3.  New severe wall thickening of esophagus with surrounding    infiltration, either esophagitis or inflammation related to recent    vomiting.

## 2024-10-27 NOTE — CHART NOTE - NSCHARTNOTEFT_GEN_A_CORE
Notified by [facility] that patient has pain in throat, esophagus. Has improvement with lidocaine swish and swallow. Reviewed chart: "continue PPI and lidocaine swish and swallow, if not improvement, may need EGD with esophageal biopsy , will make NPO for possible EGD tomorrow, will need to touch base with GI team." Primary team to touch basis with GI. No contraindications, ordered another lidocaine swish and swallow. Notified by [facility] that patient has pain in throat, esophagus. Has improvement with lidocaine swish and swallow. Reviewed chart: "continue PPI and lidocaine swish and swallow, if not improvement, may need EGD with esophageal biopsy , will make NPO for possible EGD tomorrow, will need to touch base with GI team." Primary team to touch basis with GI. No contraindications, ordered another lidocaine swish and swallow.    Notified nurse to hold Lovenox tonite for potential EGD tomorrow.

## 2024-10-27 NOTE — PROGRESS NOTE ADULT - SUBJECTIVE AND OBJECTIVE BOX
CC:    Chest pain   S:   patient remains to have indigestion feeling and chest tightness after swallowing liquid/solid  O:   Vital Signs Last 24 Hrs  T(C): 36.4 (10-27-24 @ 05:19), Max: 36.6 (10-26-24 @ 12:03)  T(F): 97.5 (10-27-24 @ 05:19), Max: 97.9 (10-26-24 @ 12:03)  HR: 82 (10-27-24 @ 05:19) (82 - 88)  BP: 153/80 (10-27-24 @ 05:19) (153/80 - 177/95)  RR: 18 (10-27-24 @ 05:19) (17 - 18)  SpO2: 100% (10-27-24 @ 05:19) (95% - 100%)    PE:   Gen: Oriented, alert, No acute distress Eyes: conjunctivae and lid normal, sclera clear with no icterus ENT: nose and throat exam normal CVS: S1, S2, RRR; no murmur, no rubs, no gallops Pulm: Good air exchange, Breath sounds equal bilaterally, no rales rhonchi,  no wheezes Chest: nontender, no chest deformity, chest movement symmetrical Gl: abdomen soft, nontender, nondistended, bowel sounds normoactive, no masses palpated Musk: no msk pain, no joint swelling Skin: no skin lesions, skin turgor normal, warm and well perfused Neuro: Awake, alert,Psych: normal affect, insight

## 2024-10-28 ENCOUNTER — TRANSCRIPTION ENCOUNTER (OUTPATIENT)
Age: 79
End: 2024-10-28

## 2024-10-28 LAB
ANION GAP SERPL CALC-SCNC: 6 MMOL/L — SIGNIFICANT CHANGE UP (ref 5–17)
BUN SERPL-MCNC: 8 MG/DL — SIGNIFICANT CHANGE UP (ref 7–18)
CALCIUM SERPL-MCNC: 9 MG/DL — SIGNIFICANT CHANGE UP (ref 8.4–10.5)
CHLORIDE SERPL-SCNC: 104 MMOL/L — SIGNIFICANT CHANGE UP (ref 96–108)
CO2 SERPL-SCNC: 29 MMOL/L — SIGNIFICANT CHANGE UP (ref 22–31)
CREAT SERPL-MCNC: 0.92 MG/DL — SIGNIFICANT CHANGE UP (ref 0.5–1.3)
EGFR: 86 ML/MIN/1.73M2 — SIGNIFICANT CHANGE UP
GLUCOSE SERPL-MCNC: 111 MG/DL — HIGH (ref 70–99)
HCT VFR BLD CALC: 40.7 % — SIGNIFICANT CHANGE UP (ref 39–50)
HGB BLD-MCNC: 14.3 G/DL — SIGNIFICANT CHANGE UP (ref 13–17)
MAGNESIUM SERPL-MCNC: 2 MG/DL — SIGNIFICANT CHANGE UP (ref 1.6–2.6)
MCHC RBC-ENTMCNC: 31.4 PG — SIGNIFICANT CHANGE UP (ref 27–34)
MCHC RBC-ENTMCNC: 35.1 GM/DL — SIGNIFICANT CHANGE UP (ref 32–36)
MCV RBC AUTO: 89.3 FL — SIGNIFICANT CHANGE UP (ref 80–100)
NRBC # BLD: 0 /100 WBCS — SIGNIFICANT CHANGE UP (ref 0–0)
PHOSPHATE SERPL-MCNC: 3.5 MG/DL — SIGNIFICANT CHANGE UP (ref 2.5–4.5)
PLATELET # BLD AUTO: 84 K/UL — LOW (ref 150–400)
POTASSIUM SERPL-MCNC: 3.4 MMOL/L — LOW (ref 3.5–5.3)
POTASSIUM SERPL-SCNC: 3.4 MMOL/L — LOW (ref 3.5–5.3)
RBC # BLD: 4.56 M/UL — SIGNIFICANT CHANGE UP (ref 4.2–5.8)
RBC # FLD: 18.7 % — HIGH (ref 10.3–14.5)
SODIUM SERPL-SCNC: 139 MMOL/L — SIGNIFICANT CHANGE UP (ref 135–145)
WBC # BLD: 6.49 K/UL — SIGNIFICANT CHANGE UP (ref 3.8–10.5)
WBC # FLD AUTO: 6.49 K/UL — SIGNIFICANT CHANGE UP (ref 3.8–10.5)

## 2024-10-28 PROCEDURE — 99232 SBSQ HOSP IP/OBS MODERATE 35: CPT | Mod: GC

## 2024-10-28 RX ORDER — POTASSIUM CHLORIDE 10 MEQ
10 TABLET, EXTENDED RELEASE ORAL ONCE
Refills: 0 | Status: DISCONTINUED | OUTPATIENT
Start: 2024-10-28 | End: 2024-10-28

## 2024-10-28 RX ORDER — PANTOPRAZOLE SODIUM 40 MG/1
1 TABLET, DELAYED RELEASE ORAL
Qty: 30 | Refills: 0
Start: 2024-10-28 | End: 2024-11-26

## 2024-10-28 RX ORDER — POTASSIUM CHLORIDE 10 MEQ
40 TABLET, EXTENDED RELEASE ORAL ONCE
Refills: 0 | Status: COMPLETED | OUTPATIENT
Start: 2024-10-28 | End: 2024-10-28

## 2024-10-28 RX ORDER — CEFPODOXIME PROXETIL 200 MG/1
1 TABLET, FILM COATED ORAL
Qty: 2 | Refills: 0
Start: 2024-10-28 | End: 2024-10-28

## 2024-10-28 RX ORDER — LIDOCAINE HYDROCHLORIDE 40 MG/ML
10 SOLUTION TOPICAL
Qty: 1 | Refills: 0
Start: 2024-10-28 | End: 2024-11-01

## 2024-10-28 RX ORDER — PANTOPRAZOLE SODIUM 40 MG/1
1 TABLET, DELAYED RELEASE ORAL
Qty: 60 | Refills: 0
Start: 2024-10-28 | End: 2024-11-26

## 2024-10-28 RX ADMIN — Medication 3 MILLIGRAM(S): at 21:38

## 2024-10-28 RX ADMIN — Medication 10 MILLIGRAM(S): at 21:38

## 2024-10-28 RX ADMIN — Medication 40 MILLIEQUIVALENT(S): at 17:16

## 2024-10-28 RX ADMIN — Medication 40 MILLIGRAM(S): at 21:38

## 2024-10-28 RX ADMIN — PANTOPRAZOLE SODIUM 40 MILLIGRAM(S): 40 TABLET, DELAYED RELEASE ORAL at 17:16

## 2024-10-28 RX ADMIN — LIDOCAINE HYDROCHLORIDE 10 MILLILITER(S): 40 SOLUTION TOPICAL at 14:56

## 2024-10-28 RX ADMIN — Medication 100 MILLIGRAM(S): at 23:24

## 2024-10-28 RX ADMIN — Medication 325 MILLIGRAM(S): at 17:15

## 2024-10-28 RX ADMIN — Medication 81 MILLIGRAM(S): at 17:16

## 2024-10-28 RX ADMIN — Medication 500 MILLIGRAM(S): at 17:15

## 2024-10-28 RX ADMIN — DOLUTEGRAVIR SODIUM 50 MILLIGRAM(S): 10 TABLET, FILM COATED ORAL at 14:56

## 2024-10-28 NOTE — PROGRESS NOTE ADULT - SUBJECTIVE AND OBJECTIVE BOX
PGY-2 Progress Note discussed with attending.    PAGER #: [[phone]] TILL 5:00 PM  PLEASE CONTACT ON CALL TEAM:  - On Call Team (Please refer to Luz) FROM 5:00 PM - 8:30PM  - Nightfloat Team FROM 8:30 -7:30 AM      INTERVAL HPI/OVERNIGHT EVENTS: Patient seen and examined at bedside. No acute overnight events.      REVIEW OF SYSTEMS:  CONSTITUTIONAL: No fever, weight loss, or fatigue.  RESPIRATORY: No cough, wheezing, chills, or hemoptysis. No shortness of breath.  CARDIOVASCULAR: No chest pain, palpitations, dizziness, or leg swelling.  GASTROINTESTINAL: No abdominal pain. No nausea, vomiting, or hematemesis. No diarrhea or constipation. No melena or hematochezia.  GENITOURINARY: No dysuria or hematuria, urinary frequency.  NEUROLOGICAL: No headaches, memory loss, loss of strength, numbness, or tremors.  SKIN: No itching, burning, rashes, or lesions.    Vital Signs Last 24 Hrs  T(C): 36.3 (28 Oct 2024 11:49), Max: 36.6 (27 Oct 2024 14:11)  T(F): 97.4 (28 Oct 2024 11:49), Max: 97.9 (27 Oct 2024 14:11)  HR: 93 (28 Oct 2024 11:49) (79 - 96)  BP: 149/81 (28 Oct 2024 11:49) (144/88 - 156/88)  BP(mean): --  RR: 18 (28 Oct 2024 11:49) (17 - 20)  SpO2: 96% (28 Oct 2024 11:49) (96% - 97%)    Parameters below as of 28 Oct 2024 11:49  Patient On (Oxygen Delivery Method): room air        PHYSICAL EXAMINATION:  GENERAL: NAD   HEAD:  Atraumatic, Normocephalic.  EYES:  Conjunctiva and sclera clear.  NECK: Supple, No JVD, Normal thyroid.  CHEST/LUNG: Clear to auscultation. Clear to percussion bilaterally. No rales, rhonchi, wheezing, or rubs.  HEART: Regular rate and rhythm. No murmurs, rubs, or gallops.  ABDOMEN: Soft, Nontender, Nondistended. Bowel sounds present.  NERVOUS SYSTEM:  Alert & Oriented X3  EXTREMITIES:  2+ Peripheral Pulses. No clubbing, cyanosis, or edema.  SKIN: Warm, dry.                          14.3   6.49  )-----------( 84       ( 28 Oct 2024 08:20 )             40.7     10-28    139  |  104  |  8   ----------------------------<  111[H]  3.4[L]   |  29  |  0.92    Ca    9.0      28 Oct 2024 08:20  Phos  3.5     10-28  Mg     2.0     10-28                CAPILLARY BLOOD GLUCOSE:      RADIOLOGY & ADDITIONAL TESTS:               PGY-2 Progress Note discussed with attending.    PAGER #: [phone]] TILL 5:00 PM  PLEASE CONTACT ON CALL TEAM:  - On Call Team (Please refer to Luz) FROM 5:00 PM - 8:30PM  - Nightfloat Team FROM 8:30 -7:30 AM      INTERVAL HPI/OVERNIGHT EVENTS: Patient seen and examined at bedside. No acute overnight events. Endorses throat pain is mildly improved. Was able to tolerate diet yesterday. Spoke to GI, no plans for EGD.      REVIEW OF SYSTEMS:  CONSTITUTIONAL: No fever, weight loss, or fatigue.  RESPIRATORY: No cough, wheezing, chills, or hemoptysis. No shortness of breath.  CARDIOVASCULAR: No chest pain, palpitations, dizziness, or leg swelling.  GASTROINTESTINAL: No abdominal pain. No nausea, vomiting, or hematemesis. No diarrhea or constipation. No melena or hematochezia.  GENITOURINARY: No dysuria or hematuria, urinary frequency.  NEUROLOGICAL: No headaches, memory loss, loss of strength, numbness, or tremors.  SKIN: No itching, burning, rashes, or lesions.    Vital Signs Last 24 Hrs  T(C): 36.3 (28 Oct 2024 11:49), Max: 36.6 (27 Oct 2024 14:11)  T(F): 97.4 (28 Oct 2024 11:49), Max: 97.9 (27 Oct 2024 14:11)  HR: 93 (28 Oct 2024 11:49) (79 - 96)  BP: 149/81 (28 Oct 2024 11:49) (144/88 - 156/88)  BP(mean): --  RR: 18 (28 Oct 2024 11:49) (17 - 20)  SpO2: 96% (28 Oct 2024 11:49) (96% - 97%)    Parameters below as of 28 Oct 2024 11:49  Patient On (Oxygen Delivery Method): room air        PHYSICAL EXAMINATION:  GENERAL: NAD   HEAD:  Atraumatic, Normocephalic.  EYES:  Conjunctiva and sclera clear.  NECK: +throat pain. Supple, No JVD, Normal thyroid.  CHEST/LUNG: Clear to auscultation. Clear to percussion bilaterally. No rales, rhonchi, wheezing, or rubs.  HEART: Regular rate and rhythm. No murmurs, rubs, or gallops.  ABDOMEN: Soft, Nontender, Nondistended. Bowel sounds present.  NERVOUS SYSTEM:  Alert & Oriented X3  EXTREMITIES:  2+ Peripheral Pulses. No clubbing, cyanosis, or edema.  SKIN: Warm, dry.                          14.3   6.49  )-----------( 84       ( 28 Oct 2024 08:20 )             40.7     10-28    139  |  104  |  8   ----------------------------<  111[H]  3.4[L]   |  29  |  0.92    Ca    9.0      28 Oct 2024 08:20  Phos  3.5     10-28  Mg     2.0     10-28                CAPILLARY BLOOD GLUCOSE:      RADIOLOGY & ADDITIONAL TESTS:

## 2024-10-28 NOTE — PROGRESS NOTE ADULT - PROBLEM SELECTOR PLAN 7
DVT: lovenox  GI: PPI
elevated blood pressure readings on admission  likely 2/2 pain  consider amlodipine 5 if BP remains elevated

## 2024-10-28 NOTE — DISCHARGE NOTE PROVIDER - PROVIDER TOKENS
PROVIDER:[TOKEN:[[id number]]],PROVIDER:[TOKEN:[[id number]]],PROVIDER:[TOKEN:[[id number]]]

## 2024-10-28 NOTE — PROGRESS NOTE ADULT - PROBLEM SELECTOR PLAN 5
Plt 93  bilirubin mildly elevated 1.3 but hbg normal and no schistocytes on peripheral smear  D-dimer 2538  CTA chest negative for PE   - likely ISO chemo vs acute infection  - f/u fibrinogen  - f/u b12 and folate

## 2024-10-28 NOTE — CONSULT NOTE ADULT - SUBJECTIVE AND OBJECTIVE BOX
Hematology Oncology Consult Progress Note    Patient is a 79y old  Male who presents with a chief complaint of Weakness (28 Oct 2024 10:02)      SUBJECTIVE / OVERNIGHT EVENTS:    ADDITIONAL REVIEW OF SYSTEMS:    MEDICATIONS  (STANDING):  ascorbic acid 500 milliGRAM(s) Oral daily  aspirin  chewable 81 milliGRAM(s) Oral daily  atorvastatin 10 milliGRAM(s) Oral at bedtime  cefTRIAXone   IVPB 1000 milliGRAM(s) IV Intermittent every 24 hours  dolutegravir 50 milliGRAM(s) Oral daily  enoxaparin Injectable 40 milliGRAM(s) SubCutaneous every 24 hours  ferrous    sulfate 325 milliGRAM(s) Oral daily  influenza  Vaccine (HIGH DOSE) 0.5 milliLiter(s) IntraMuscular once  lidocaine 2% Viscous 10 milliLiter(s) Oral daily  pantoprazole    Tablet 40 milliGRAM(s) Oral two times a day  potassium chloride  10 mEq/100 mL IVPB 10 milliEquivalent(s) IV Intermittent once    MEDICATIONS  (PRN):  acetaminophen     Tablet .. 650 milliGRAM(s) Oral every 6 hours PRN Temp greater or equal to 38C (100.4F), Mild Pain (1 - 3)  aluminum hydroxide/magnesium hydroxide/simethicone Suspension 30 milliLiter(s) Oral every 4 hours PRN Dyspepsia  melatonin 3 milliGRAM(s) Oral at bedtime PRN Insomnia  ondansetron Injectable 4 milliGRAM(s) IV Push every 8 hours PRN Nausea and/or Vomiting    CAPILLARY BLOOD GLUCOSE        I&O's Summary      PHYSICAL EXAM:  Vital Signs Last 24 Hrs  T(C): 36.3 (28 Oct 2024 11:49), Max: 36.3 (27 Oct 2024 19:49)  T(F): 97.4 (28 Oct 2024 11:49), Max: 97.4 (27 Oct 2024 19:49)  HR: 93 (28 Oct 2024 11:49) (79 - 93)  BP: 149/81 (28 Oct 2024 11:49) (144/88 - 156/88)  BP(mean): --  RR: 18 (28 Oct 2024 11:49) (18 - 20)  SpO2: 96% (28 Oct 2024 11:49) (96% - 97%)    Parameters below as of 28 Oct 2024 11:49  Patient On (Oxygen Delivery Method): room air      CONSTITUTIONAL: NAD, well-developed, well-groomed  ENMT: Moist oral mucosa, no pharyngeal injection or exudates; normal dentition  RESPIRATORY: Normal respiratory effort; lungs are clear to auscultation bilaterally  CARDIOVASCULAR: Regular rate and rhythm, normal S1 and S2, no murmur/rub/gallop; No lower extremity edema; Peripheral pulses are 2+ bilaterally  ABDOMEN: Nontender to palpation, normoactive bowel sounds, no rebound/guarding; No hepatosplenomegaly  PSYCH: A+O to person, place, and time; affect appropriate  NEUROLOGY: CN 2-12 are intact and symmetric; no gross sensory deficits   SKIN: No rashes; no palpable lesions    LABS:                        14.3   6.49  )-----------( 84       ( 28 Oct 2024 08:20 )             40.7     10-28    139  |  104  |  8   ----------------------------<  111[H]  3.4[L]   |  29  |  0.92    Ca    9.0      28 Oct 2024 08:20  Phos  3.5     10-28  Mg     2.0     10-28            Urinalysis Basic - ( 28 Oct 2024 08:20 )    Color: x / Appearance: x / SG: x / pH: x  Gluc: 111 mg/dL / Ketone: x  / Bili: x / Urobili: x   Blood: x / Protein: x / Nitrite: x   Leuk Esterase: x / RBC: x / WBC x   Sq Epi: x / Non Sq Epi: x / Bacteria: x            RADIOLOGY & ADDITIONAL TESTS:  Imaging from Last 24 Hours:    Electrocardiogram/QTc Interval:    COORDINATION OF CARE:  Care Discussed with Consultants/Other Providers:   Hematology Oncology Consult Progress Note    Mr. Zavala is 79M PMHx HIV, Stage 4 colon CA w/ liver mets and HLD presented to the ED with c/o right sided chest pain/epigastric pain and generalized weakness for 1 day. Patient last received chemo 1 week ago. Pateint stated the pain is similar to previous pain but more severe. It is non radiating, not associated with food intake, inhalation or position. Patient denies any fever, chills, dizziness ,headache, SOB, cough, palpitations, nausea, vomiting, diarrhea, abdominal pain, urinary symptoms, lower extremity pain, or edema. Patient denies recent travel or sick contacts. Found he has pneumonia and started on ABx. GI was consulted for odynophagia without dysphagia, no oral thrush, the most likely diagnosis is post chemotherapy mucositis and the recommendation is ISO recent chemo therapy and PPI BID, soft diet, lidocaine swish and swallow . If the patient does not improve in fe w days he may requires EGD with esophageal biopsy ot rule out HSV, CMV or ulceration           SUBJECTIVE / OVERNIGHT EVENTS:    ADDITIONAL REVIEW OF SYSTEMS:    MEDICATIONS  (STANDING):  ascorbic acid 500 milliGRAM(s) Oral daily  aspirin  chewable 81 milliGRAM(s) Oral daily  atorvastatin 10 milliGRAM(s) Oral at bedtime  cefTRIAXone   IVPB 1000 milliGRAM(s) IV Intermittent every 24 hours  dolutegravir 50 milliGRAM(s) Oral daily  enoxaparin Injectable 40 milliGRAM(s) SubCutaneous every 24 hours  ferrous    sulfate 325 milliGRAM(s) Oral daily  influenza  Vaccine (HIGH DOSE) 0.5 milliLiter(s) IntraMuscular once  lidocaine 2% Viscous 10 milliLiter(s) Oral daily  pantoprazole    Tablet 40 milliGRAM(s) Oral two times a day  potassium chloride  10 mEq/100 mL IVPB 10 milliEquivalent(s) IV Intermittent once    MEDICATIONS  (PRN):  acetaminophen     Tablet .. 650 milliGRAM(s) Oral every 6 hours PRN Temp greater or equal to 38C (100.4F), Mild Pain (1 - 3)  aluminum hydroxide/magnesium hydroxide/simethicone Suspension 30 milliLiter(s) Oral every 4 hours PRN Dyspepsia  melatonin 3 milliGRAM(s) Oral at bedtime PRN Insomnia  ondansetron Injectable 4 milliGRAM(s) IV Push every 8 hours PRN Nausea and/or Vomiting    CAPILLARY BLOOD GLUCOSE        I&O's Summary      PHYSICAL EXAM:  Vital Signs Last 24 Hrs  T(C): 36.3 (28 Oct 2024 11:49), Max: 36.3 (27 Oct 2024 19:49)  T(F): 97.4 (28 Oct 2024 11:49), Max: 97.4 (27 Oct 2024 19:49)  HR: 93 (28 Oct 2024 11:49) (79 - 93)  BP: 149/81 (28 Oct 2024 11:49) (144/88 - 156/88)  BP(mean): --  RR: 18 (28 Oct 2024 11:49) (18 - 20)  SpO2: 96% (28 Oct 2024 11:49) (96% - 97%)    Parameters below as of 28 Oct 2024 11:49  Patient On (Oxygen Delivery Method): room air      CONSTITUTIONAL: NAD, well-developed, well-groomed  ENMT: Moist oral mucosa, no pharyngeal injection or exudates; normal dentition  RESPIRATORY: Normal respiratory effort; lungs are clear to auscultation bilaterally  CARDIOVASCULAR: Regular rate and rhythm, normal S1 and S2, no murmur/rub/gallop; No lower extremity edema; Peripheral pulses are 2+ bilaterally  ABDOMEN: Nontender to palpation, normoactive bowel sounds, no rebound/guarding; No hepatosplenomegaly  PSYCH: A+O to person, place, and time; affect appropriate  NEUROLOGY: CN 2-12 are intact and symmetric; no gross sensory deficits   SKIN: No rashes; no palpable lesions    LABS:                        14.3   6.49  )-----------( 84       ( 28 Oct 2024 08:20 )             40.7     10-28    139  |  104  |  8   ----------------------------<  111[H]  3.4[L]   |  29  |  0.92    Ca    9.0      28 Oct 2024 08:20  Phos  3.5     10-28  Mg     2.0     10-28            Urinalysis Basic - ( 28 Oct 2024 08:20 )    Color: x / Appearance: x / SG: x / pH: x  Gluc: 111 mg/dL / Ketone: x  / Bili: x / Urobili: x   Blood: x / Protein: x / Nitrite: x   Leuk Esterase: x / RBC: x / WBC x   Sq Epi: x / Non Sq Epi: x / Bacteria: x      MAGING:      < from: CT Abdomen and Pelvis w/ IV Cont (10.24.24 @ 15:07) >  IMPRESSION:  1.  No pulmonary embolism.  2.  Small right lower lobe consolidation is suspicious for pneumonia.  3.  New severe wall thickening of esophagus with surrounding   infiltration, either esophagitis or inflammation related to recent   vomiting.          RADIOLOGY & ADDITIONAL TESTS:  Imaging from Last 24 Hours:    Electrocardiogram/QTc Interval:    COORDINATION OF CARE:  Care Discussed with Consultants/Other Providers:   Hematology Oncology Consult Progress Note    Mr. Zavala is 79M PMHx HIV, Stage 4 colon CA w/ liver mets and HLD presented to the ED with c/o right sided chest pain/epigastric pain and generalized weakness for 1 day. Patient last received chemo 1 week ago. Pateint stated the pain is similar to previous pain but more severe. It is non radiating, not associated with food intake, inhalation or position. Patient denies any fever, chills, dizziness ,headache, SOB, cough, palpitations, nausea, vomiting, diarrhea, abdominal pain, urinary symptoms, lower extremity pain, or edema. Patient denies recent travel or sick contacts. Found he has pneumonia and started on ABx. GI was consulted for odynophagia without dysphagia, no oral thrush, the most likely diagnosis is post chemotherapy mucositis and the recommendation is ISO recent chemo therapy and PPI BID, soft diet, lidocaine swish and swallow . If the patient does not improve in fe w days he may requires EGD with esophageal biopsy ot rule out HSV, CMV or ulceration           SUBJECTIVE / OVERNIGHT EVENTS:  I've seen and examined the patient at the bedside.   No thrush or oral mucosa inflammation was noted on physical exam. The odynophagia has improved since the patient started using lidocain swish and swallow. He ia medically stable and able to eat. He has been informed that he may be discharged home tomorow per the primary team.    ADDITIONAL REVIEW OF SYSTEMS:    MEDICATIONS  (STANDING):  ascorbic acid 500 milliGRAM(s) Oral daily  aspirin  chewable 81 milliGRAM(s) Oral daily  atorvastatin 10 milliGRAM(s) Oral at bedtime  cefTRIAXone   IVPB 1000 milliGRAM(s) IV Intermittent every 24 hours  dolutegravir 50 milliGRAM(s) Oral daily  enoxaparin Injectable 40 milliGRAM(s) SubCutaneous every 24 hours  ferrous    sulfate 325 milliGRAM(s) Oral daily  influenza  Vaccine (HIGH DOSE) 0.5 milliLiter(s) IntraMuscular once  lidocaine 2% Viscous 10 milliLiter(s) Oral daily  pantoprazole    Tablet 40 milliGRAM(s) Oral two times a day  potassium chloride  10 mEq/100 mL IVPB 10 milliEquivalent(s) IV Intermittent once    MEDICATIONS  (PRN):  acetaminophen     Tablet .. 650 milliGRAM(s) Oral every 6 hours PRN Temp greater or equal to 38C (100.4F), Mild Pain (1 - 3)  aluminum hydroxide/magnesium hydroxide/simethicone Suspension 30 milliLiter(s) Oral every 4 hours PRN Dyspepsia  melatonin 3 milliGRAM(s) Oral at bedtime PRN Insomnia  ondansetron Injectable 4 milliGRAM(s) IV Push every 8 hours PRN Nausea and/or Vomiting    CAPILLARY BLOOD GLUCOSE        I&O's Summary      PHYSICAL EXAM:  Vital Signs Last 24 Hrs  T(C): 36.3 (28 Oct 2024 11:49), Max: 36.3 (27 Oct 2024 19:49)  T(F): 97.4 (28 Oct 2024 11:49), Max: 97.4 (27 Oct 2024 19:49)  HR: 93 (28 Oct 2024 11:49) (79 - 93)  BP: 149/81 (28 Oct 2024 11:49) (144/88 - 156/88)  BP(mean): --  RR: 18 (28 Oct 2024 11:49) (18 - 20)  SpO2: 96% (28 Oct 2024 11:49) (96% - 97%)    Parameters below as of 28 Oct 2024 11:49  Patient On (Oxygen Delivery Method): room air      CONSTITUTIONAL: NAD, well-developed, well-groomed  ENMT: Moist oral mucosa, no pharyngeal injection or exudates; normal dentition  RESPIRATORY: Normal respiratory effort; lungs are clear to auscultation bilaterally  CARDIOVASCULAR: Regular rate and rhythm, normal S1 and S2, no murmur/rub/gallop; No lower extremity edema; Peripheral pulses are 2+ bilaterally  ABDOMEN: Nontender to palpation, normoactive bowel sounds, no rebound/guarding; No hepatosplenomegaly  PSYCH: A+O to person, place, and time; affect appropriate  NEUROLOGY: CN 2-12 are intact and symmetric; no gross sensory deficits   SKIN: No rashes; no palpable lesions    LABS:                        14.3   6.49  )-----------( 84       ( 28 Oct 2024 08:20 )             40.7     10-28    139  |  104  |  8   ----------------------------<  111[H]  3.4[L]   |  29  |  0.92    Ca    9.0      28 Oct 2024 08:20  Phos  3.5     10-28  Mg     2.0     10-28            Urinalysis Basic - ( 28 Oct 2024 08:20 )    Color: x / Appearance: x / SG: x / pH: x  Gluc: 111 mg/dL / Ketone: x  / Bili: x / Urobili: x   Blood: x / Protein: x / Nitrite: x   Leuk Esterase: x / RBC: x / WBC x   Sq Epi: x / Non Sq Epi: x / Bacteria: x      MAGING:      < from: CT Abdomen and Pelvis w/ IV Cont (10.24.24 @ 15:07) >  IMPRESSION:  1.  No pulmonary embolism.  2.  Small right lower lobe consolidation is suspicious for pneumonia.  3.  New severe wall thickening of esophagus with surrounding   infiltration, either esophagitis or inflammation related to recent   vomiting.        RADIOLOGY & ADDITIONAL TESTS:  Imaging from Last 24 Hours:    Electrocardiogram/QTc Interval:    COORDINATION OF CARE:  Care Discussed with Consultants/Other Providers:

## 2024-10-28 NOTE — DISCHARGE NOTE PROVIDER - NSDCCPCAREPLAN_GEN_ALL_CORE_FT
PRINCIPAL DISCHARGE DIAGNOSIS  Diagnosis: Pneumonia  Assessment and Plan of Treatment: You presented with complaints of chest and RUQ pain for 1 day duration. Imaging was done, including CT whic showed small Right lower lobe consolidation suspicious for pneumonia. New severe wall thickening of esophagus with surrounding infiltration, either esophagitis or inflammation related to recent vomiting. You were treated with IV antibtioics and IV fluids. You will be discharged on oral antibtioics to complete the antibtioic course. Please START TAKING CEFPODOXIME as prescribed.   Please follow up with your primary care physician in one week to inform them of your recent hospitalization and further management of your medical conditions.        SECONDARY DISCHARGE DIAGNOSES  Diagnosis: Oral mucositis  Assessment and Plan of Treatment: You presented with throat pain. CT imaging showed wall thickening of the esophagus suspicious for esophagitis or inflammation. We suspect you have chemo-induced mucositis. You were evaluated by Gastroenterology specialists. You were treated with lidocaine swish and swallow. Your symptoms improved and you were able to tolerate diet. PLEASE CONTINUE to use lidocaine swish and swallow as prescribed for 5 more days. PLEASE START TAKING PANTOPRAZOLE as prescribed.  Please FOLLOW UP with GI specialists as outpatient for outaptient endoscopy (information for Dr Ware provided). Please FOLLOW UP with your primary care physician in one week to inform them of your recent hospitalization and further management of your medical conditions.      Diagnosis: Adenocarcinoma of colon  Assessment and Plan of Treatment: You have history of colon adenocarcinoma on chemotherapy. Please CONTINUE to follow up with your Heme Oncologist for further treatment.    Diagnosis: HIV positive  Assessment and Plan of Treatment: You have history of HIV. Please CONTINUE to take your HAART medications as prescribed.  Please follow up with your primary care physician in one week to inform them of your recent hospitalization and further management of your medical conditions.       PRINCIPAL DISCHARGE DIAGNOSIS  Diagnosis: Pneumonia  Assessment and Plan of Treatment: You presented with complaints of chest and RUQ pain for 1 day duration. Imaging was done, including CT whic showed small Right lower lobe consolidation suspicious for pneumonia. New severe wall thickening of esophagus with surrounding infiltration, either esophagitis or inflammation related to recent vomiting. You were treated with IV antibtioics and IV fluids. You completed the antibtioic course.   Please FOLLOW UP with your primary care physician in one week to inform them of your recent hospitalization and further management of your medical conditions.        SECONDARY DISCHARGE DIAGNOSES  Diagnosis: Oral mucositis  Assessment and Plan of Treatment: You presented with throat pain. CT imaging showed wall thickening of the esophagus suspicious for esophagitis or inflammation. We suspect you have chemo-induced mucositis. You were evaluated by Gastroenterology specialists. You were treated with lidocaine swish and swallow. Your symptoms improved and you were able to tolerate diet. PLEASE CONTINUE to use LIDOCAINE SWISH AND SWALLOW as prescribed for 5 more days. PLEASE START TAKING PANTOPRAZOLE as prescribed.  Please FOLLOW UP with GASTROENTERTOLOGY specialists as outpatient for outaptient endoscopy (information for Dr Ware provided). Please FOLLOW UP with your primary care physician in one week to inform them of your recent hospitalization and further management of your medical conditions.      Diagnosis: Adenocarcinoma of colon  Assessment and Plan of Treatment: You have history of colon adenocarcinoma on chemotherapy. Please CONTINUE to follow up with your Heme Oncologist for further treatment.    Diagnosis: HIV positive  Assessment and Plan of Treatment: You have history of HIV. Please CONTINUE to take your HAART medications as prescribed.  Please follow up with your primary care physician in one week to inform them of your recent hospitalization and further management of your medical conditions.

## 2024-10-28 NOTE — PROGRESS NOTE ADULT - PROBLEM SELECTOR PLAN 1
CT chest - Emphysema and RLL consolidation suspicious for PNA.  - c/w CTX, azithro  - legionella, mycoplasma, strep pneumo - neg  - BCX - NGTD

## 2024-10-28 NOTE — DISCHARGE NOTE PROVIDER - NSDCMRMEDTOKEN_GEN_ALL_CORE_FT
aspirin 81 mg oral tablet: 1 tab(s) orally once a day  atorvastatin 10 mg oral tablet: 1 tab(s) orally once a day (at bedtime)  cefpodoxime 200 mg oral tablet: 1 tab(s) orally 2 times a day  ferrous sulfate 325 mg (65 mg elemental iron) oral tablet: 1 tab(s) orally every other day  lidocaine 2% mucous membrane solution: 10 milliliter(s) mucous membrane once a day LIDOCAINE SWISH AND SWALLOW  pantoprazole 40 mg oral delayed release tablet: 1 tab(s) orally once a day  Symtuza oral tablet: 1 tab(s) orally once a day  Tivicay 50 mg oral tablet: 1 tab(s) orally once a day  Vitamin C 500 mg oral tablet: 1 tab(s) orally once a day   aspirin 81 mg oral tablet: 1 tab(s) orally once a day  atorvastatin 10 mg oral tablet: 1 tab(s) orally once a day (at bedtime)  ferrous sulfate 325 mg (65 mg elemental iron) oral tablet: 1 tab(s) orally every other day  lidocaine 2% mucous membrane solution: 10 milliliter(s) mucous membrane once a day LIDOCAINE SWISH AND SWALLOW  pantoprazole 40 mg oral delayed release tablet: 1 tab(s) orally once a day  Symtuza oral tablet: 1 tab(s) orally once a day  Tivicay 50 mg oral tablet: 1 tab(s) orally once a day  Vitamin C 500 mg oral tablet: 1 tab(s) orally once a day

## 2024-10-28 NOTE — DISCHARGE NOTE PROVIDER - HOSPITAL COURSE
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 Presents with chest and RUQ pain x1d. CT C/A/P: Small RLL consolidation suspicious for pneumonia. New severe wall thickening of esophagus with surrounding infiltration, either esophagitis or inflammation related to recent vomiting. Admitted for PNA and concerns for esophagitis. Evaluated by GI. Likely chemo-induced mucositis. Patient with improvement on lidocaine swish swallow. No indications for EGD while inpatient. Tolerating diet. Treated with CTX and azithro for PNA. Patient to be discharged on oral abx to complete antibiotic course, lidocaine swish swallow, and pantoprazole.     Patient is stable for discharge per attending and is advised to follow up with PCP as outpatient. Please refer to patient's complete medical chart with documents for a full hospital course, for this is only a brief summary.     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 Presents with chest and RUQ pain x1d. CT C/A/P: Small RLL consolidation suspicious for pneumonia. New severe wall thickening of esophagus with surrounding infiltration, either esophagitis or inflammation related to recent vomiting. Admitted for PNA and concerns for esophagitis. Evaluated by GI. Likely chemo-induced mucositis. Patient with improvement on lidocaine swish swallow. No indications for EGD while inpatient. Tolerating diet. Treated with course of CTX and azithro for PNA. Patient to be discharged on lidocaine swish swallow, and pantoprazole.     Patient is stable for discharge per attending and is advised to follow up with PCP and GI as outpatient. Please refer to patient's complete medical chart with documents for a full hospital course, for this is only a brief summary.

## 2024-10-28 NOTE — CHART NOTE - NSCHARTNOTEFT_GEN_A_CORE
EVENT: EGD    SUBJECTIVE:    OBJECTIVE:  Vital Signs Last 24 Hrs  T(C): 36.3 (28 Oct 2024 11:49), Max: 36.6 (27 Oct 2024 14:11)  T(F): 97.4 (28 Oct 2024 11:49), Max: 97.9 (27 Oct 2024 14:11)  HR: 93 (28 Oct 2024 11:49) (79 - 96)  BP: 149/81 (28 Oct 2024 11:49) (144/88 - 156/88)  BP(mean): --  RR: 18 (28 Oct 2024 11:49) (17 - 20)  SpO2: 96% (28 Oct 2024 11:49) (96% - 97%)    Parameters below as of 28 Oct 2024 11:49  Patient On (Oxygen Delivery Method): room air        LABS:                        14.3   6.49  )-----------( 84       ( 28 Oct 2024 08:20 )             40.7     10-28    139  |  104  |  8   ----------------------------<  111[H]  3.4[L]   |  29  |  0.92    Ca    9.0      28 Oct 2024 08:20  Phos  3.5     10-28  Mg     2.0     10-28        EKG:   IMAGING:    ASSESSMENT:  HPI:  79M PMHx HIV, Stage 4 colon CA w/ liver mets and HLD presented to the ED with c/o right sided chest pain and generalized weakness for 1 day. Patient last received chemo 1 week ago. Pateint stated the pain is similar to previous pain but more severe. It is non radiating, not associated with food intake, inhalation or position. Patient denies any fever, chills, dizziness ,headache, SOB, cough, palpitations, nausea, vomiting, diarrhea, abdominal pain, urinary symptoms, lower extremity pain, or edema. Patient denies recent travel or sick contacts.  (24 Oct 2024 19:57)      PLAN:   -Patient voice improvement on current medication management  -Patient denies any esophageal pain at this time  -given current presentation. No need for endoscopic intervention at this time  -Will continue to follow as needed.

## 2024-10-28 NOTE — DISCHARGE NOTE PROVIDER - ATTENDING DISCHARGE PHYSICAL EXAMINATION:
Constitutional/General: Well developed, vitals reviewed  EYE: Symmetrical pupils, conjunctiva clear   ENT: Good dentition, oropharynx clear  NECK: No visual masses, no JVD  CHEST: No respiratory distress, bilateral symmetrical chest rise  ABDOMEN: Nondistended, no visual masses  SKIN: No rash, warm, dry  NEURO: Alert, Cranial nerves grossly intact, moves all extremities, follows commands

## 2024-10-28 NOTE — CONSULT NOTE ADULT - NS ATTEND AMEND GEN_ALL_CORE FT
Please see above assessment and plan
Agree with above.    I saw and examined the patient on 10-25-24. I agree with the findings and plan of care as documented in the fellow/resident/medical student/nurse practitioner/physician assistant.    Today we are treating the patient for:   Epigastric pain  CT with esophageal thickening    ***General note with plan / recommendation:   79 year old man with history of metastatic colon cancer on chemotherapy who post treatment is presenting with epigastric pain. Patient states he has odynophagia and not dysphagia. On exam no oral thrush.  The most likely diagnosis is post chemotherapy mucositis. Treatment is PPI 40mg Q12 and lidocaine swish and swallow. Expect the pain to improve in the next few days however patient was warned that with chemotherapy the pain can come back.    If patient's pain does not improve, he may require EGD with esophageal biopsy to rule out HSV, CMV, or ulcerations.      Billing:  I have reviewed records from [place]    Other:  - Thank you for involving us in the care of this patient. If you have any questions regarding the plan of care please do not hesitate to call us back.

## 2024-10-28 NOTE — PROGRESS NOTE ADULT - ASSESSMENT
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 Presents with chest and RUQ pain x1d. CT C/A/P: Small RLL consolidation suspicious for pneumonia. New severe wall thickening of esophagus with surrounding infiltration, either esophagitis or inflammation related to recent vomiting. Admitted for PNA and esophagitis.

## 2024-10-28 NOTE — DISCHARGE NOTE PROVIDER - CARE PROVIDERS DIRECT ADDRESSES
[email or web address]

## 2024-10-28 NOTE — PROGRESS NOTE ADULT - ATTENDING COMMENTS
CC:    Chest pain   S:   patient states when he swallows, he still feels indigestion and something "stuck" on his chest  O:   Vital Signs Last 24 Hrs  T(C): 36.3 (10-28-24 @ 11:49), Max: 36.6 (10-27-24 @ 14:11)  T(F): 97.4 (10-28-24 @ 11:49), Max: 97.9 (10-27-24 @ 14:11)  HR: 93 (10-28-24 @ 11:49) (79 - 96)  BP: 149/81 (10-28-24 @ 11:49) (144/88 - 156/88)  RR: 18 (10-28-24 @ 11:49) (17 - 20)  SpO2: 96% (10-28-24 @ 11:49) (96% - 97%)  PE:   Gen: Oriented, alert, No acute distress Eyes: conjunctivae and lid normal, sclera clear with no icterus ENT: nose and throat exam normal CVS: S1, S2, RRR; no murmur, no rubs, no gallops Pulm: Good air exchange, Breath sounds equal bilaterally, no rales rhonchi,  no wheezes Chest: nontender, no chest deformity, chest movement symmetrical Gl: abdomen soft, nontender, nondistended, bowel sounds normoactive, no masses palpated Musk: no msk pain, no joint swelling Skin: no skin lesions, skin turgor normal, warm and well perfused Neuro: Awake, alert,Psych: normal affect, insight     Assessment and Plan:   Mr. Zavala is a 78 yo man with PMH significant for HIV/AIDS, Hepatitis B, iron deficiecny anemia, adenoCA of colon (followed by [facility]) who presents with R sided chest pain found to have RLL pneumonia.      #RLL Pneumonia   #Esophagitis   #AdenoCA of Colon   #HIV/AIDS   #Hepatitis B   #MAISHA   #Thrombocytopenia   #Hypokalemia  -continue IV ceftriaxone  -dc IV fluids   -legionella neg, MRSA, mycoplasma, strep pneumoniae neg, sputum cx   -nutrition consult   -History also obtained from wife.   -Consult heme onc [facility] as patient follows them. Started FOLFOX since 7/2024   -GI consulted, discussed, continue PPI and lidocaine swish and swallow, if not improvement, may need EGD with esophageal biopsy , will make NPO for possible EGD today, pending recs  -Continue medications: atorvastatin 10 mg tablet,  Aspirin 81 mg , Calcium 600 + D(3) 600 mg­400 unit Tab, clopidogrel 75 mg tablet, Tivicay , complete med rec  -f/u HIV viral load not detected , cd4 count reviewed, CD4 369  -monitor CBC   -f/u blood cx no growth  -f/u UA reviewed  -LFTs unremarkable   -replace K  -DVT ppx: hold for possible proceudre      Labs reviewed:                                               14.3   6.49  )-----------( 84       ( 28 Oct 2024 08:20 )             40.7   10-28    139  |  104  |  8   ----------------------------<  111[H]  3.4[L]   |  29  |  0.92    Ca    9.0      28 Oct 2024 08:20  Phos  3.5     10-28  Mg     2.0     10-28      Imaging reviewed:    CTA Chest, abdomen   1.  No pulmonary embolism.   2.  Small right lower lobe consolidation is suspicious for pneumonia.   3.  New severe wall thickening of esophagus with surrounding    infiltration, either esophagitis or inflammation related to recent    vomiting.

## 2024-10-28 NOTE — PROGRESS NOTE ADULT - PROBLEM SELECTOR PLAN 2
p/w globus sensation and 1 episode of associated vomiting  unable to tolerate regular diet on admission  - CT chest shows severe esophageal wall thickening with surrounding fatty infiltration  - suspect chemo-induced mucositis  - improved on lidocaine swish swallow  - able to tolerate oral diet now   - GI consulted - low suspicion for HSV, CMV, ulceration. No EGD indications while inpatient.

## 2024-10-28 NOTE — CONSULT NOTE ADULT - ASSESSMENT
Mr. Zavala is 79M PMHx HIV, Stage 4 colon CA w/ liver mets who was admitted with epigastric/throat pain and generalized weakness for 1 day.  Found he has pneumonia and started on ABx. GI was consulted for odynophagia without dysphagia and they recommend conservative therapy for most likley post chemotherapy mucositis.     #Stage 4 colon CA w/ liver mets on chemotherapy Folfox + Avstin last dose a weel ago, follows with Dr. Cadet, CT scan showed therapy response  #PNA on ABX- stable on RA  #odynophagia without dysphagia and they recommend conservative therapy for most likley post chemotherapy mucositis.     No thrush or oral mucosa inflammation was noted on physical exam. The odynophagia has improved since the patient started using lidocain swish and swallow. He ia medically stable and able to eat. He has been informed that he may be discharged home tomorow per the primary team.    Recc's:  -Hold chemotherapy while inpatient  -Agree with GI recc's  -Patient will follow up with Dr. Cadet after discharge, with an an appointment scheduled for 11/06/2024    Thank you for the consult. Hem/Onc team will sign off.

## 2024-10-28 NOTE — DISCHARGE NOTE PROVIDER - CARE PROVIDER_API CALL
Laura Ware  Gastroenterology  [address]  [place] [address]  Phone: [phone]  Fax: [phone]  Follow Up Time:     Luis Saxena  Cardiovascular Disease  [address] [place] [address]  Phone: [phone]  Fax: [phone]  Follow Up Time:     Sulema Cadet  Hematology/Oncology  [address], [address]  [place] [address]  Phone: [phone]  Fax: [phone]  Follow Up Time:

## 2024-10-29 ENCOUNTER — TRANSCRIPTION ENCOUNTER (OUTPATIENT)
Age: 79
End: 2024-10-29

## 2024-10-29 VITALS
DIASTOLIC BLOOD PRESSURE: 83 MMHG | OXYGEN SATURATION: 99 % | SYSTOLIC BLOOD PRESSURE: 146 MMHG | TEMPERATURE: 98 F | RESPIRATION RATE: 18 BRPM | HEART RATE: 86 BPM

## 2024-10-29 LAB
ANION GAP SERPL CALC-SCNC: 6 MMOL/L — SIGNIFICANT CHANGE UP (ref 5–17)
ANISOCYTOSIS BLD QL: SLIGHT — SIGNIFICANT CHANGE UP
BUN SERPL-MCNC: 9 MG/DL — SIGNIFICANT CHANGE UP (ref 7–18)
CALCIUM SERPL-MCNC: 9.1 MG/DL — SIGNIFICANT CHANGE UP (ref 8.4–10.5)
CHLORIDE SERPL-SCNC: 102 MMOL/L — SIGNIFICANT CHANGE UP (ref 96–108)
CO2 SERPL-SCNC: 28 MMOL/L — SIGNIFICANT CHANGE UP (ref 22–31)
CREAT SERPL-MCNC: 0.98 MG/DL — SIGNIFICANT CHANGE UP (ref 0.5–1.3)
CULTURE RESULTS: SIGNIFICANT CHANGE UP
CULTURE RESULTS: SIGNIFICANT CHANGE UP
EGFR: 79 ML/MIN/1.73M2 — SIGNIFICANT CHANGE UP
GLUCOSE SERPL-MCNC: 109 MG/DL — HIGH (ref 70–99)
HCT VFR BLD CALC: 40.6 % — SIGNIFICANT CHANGE UP (ref 39–50)
HGB BLD-MCNC: 14.2 G/DL — SIGNIFICANT CHANGE UP (ref 13–17)
M PNEUMO IGM SER-ACNC: 0.21 INDEX — SIGNIFICANT CHANGE UP (ref 0–0.9)
MACROCYTES BLD QL: SLIGHT — SIGNIFICANT CHANGE UP
MAGNESIUM SERPL-MCNC: 2 MG/DL — SIGNIFICANT CHANGE UP (ref 1.6–2.6)
MANUAL SMEAR VERIFICATION: SIGNIFICANT CHANGE UP
MCHC RBC-ENTMCNC: 31 PG — SIGNIFICANT CHANGE UP (ref 27–34)
MCHC RBC-ENTMCNC: 35 GM/DL — SIGNIFICANT CHANGE UP (ref 32–36)
MCV RBC AUTO: 88.6 FL — SIGNIFICANT CHANGE UP (ref 80–100)
MICROCYTES BLD QL: SLIGHT — SIGNIFICANT CHANGE UP
MYCOPLASMA AG SPEC QL: NEGATIVE — SIGNIFICANT CHANGE UP
NRBC # BLD: 0 /100 WBCS — SIGNIFICANT CHANGE UP (ref 0–0)
PHOSPHATE SERPL-MCNC: 3.3 MG/DL — SIGNIFICANT CHANGE UP (ref 2.5–4.5)
PLAT MORPH BLD: NORMAL — SIGNIFICANT CHANGE UP
PLATELET # BLD AUTO: 83 K/UL — LOW (ref 150–400)
PLATELET COUNT - ESTIMATE: ABNORMAL
POIKILOCYTOSIS BLD QL AUTO: SLIGHT — SIGNIFICANT CHANGE UP
POLYCHROMASIA BLD QL SMEAR: SLIGHT — SIGNIFICANT CHANGE UP
POTASSIUM SERPL-MCNC: 3.5 MMOL/L — SIGNIFICANT CHANGE UP (ref 3.5–5.3)
POTASSIUM SERPL-SCNC: 3.5 MMOL/L — SIGNIFICANT CHANGE UP (ref 3.5–5.3)
RBC # BLD: 4.58 M/UL — SIGNIFICANT CHANGE UP (ref 4.2–5.8)
RBC # FLD: 18.6 % — HIGH (ref 10.3–14.5)
RBC BLD AUTO: ABNORMAL
SODIUM SERPL-SCNC: 136 MMOL/L — SIGNIFICANT CHANGE UP (ref 135–145)
SPECIMEN SOURCE: SIGNIFICANT CHANGE UP
SPECIMEN SOURCE: SIGNIFICANT CHANGE UP
WBC # BLD: 4.96 K/UL — SIGNIFICANT CHANGE UP (ref 3.8–10.5)
WBC # FLD AUTO: 4.96 K/UL — SIGNIFICANT CHANGE UP (ref 3.8–10.5)

## 2024-10-29 PROCEDURE — 99239 HOSP IP/OBS DSCHRG MGMT >30: CPT | Mod: GC

## 2024-10-29 RX ORDER — PANTOPRAZOLE SODIUM 40 MG/1
1 TABLET, DELAYED RELEASE ORAL
Qty: 30 | Refills: 0
Start: 2024-10-29 | End: 2024-11-27

## 2024-10-29 RX ORDER — LIDOCAINE HYDROCHLORIDE 40 MG/ML
10 SOLUTION TOPICAL
Qty: 1 | Refills: 0
Start: 2024-10-29 | End: 2024-11-02

## 2024-10-29 RX ADMIN — Medication 81 MILLIGRAM(S): at 11:23

## 2024-10-29 RX ADMIN — Medication 500 MILLIGRAM(S): at 11:23

## 2024-10-29 RX ADMIN — PANTOPRAZOLE SODIUM 40 MILLIGRAM(S): 40 TABLET, DELAYED RELEASE ORAL at 06:02

## 2024-10-29 RX ADMIN — LIDOCAINE HYDROCHLORIDE 10 MILLILITER(S): 40 SOLUTION TOPICAL at 11:38

## 2024-10-29 NOTE — DISCHARGE NOTE NURSING/CASE MANAGEMENT/SOCIAL WORK - FINANCIAL ASSISTANCE
[facility] provides services at a reduced cost to those who are determined to be eligible through [facility]’s financial assistance program. Information regarding [facility]’s financial assistance program can be found by going to [email or web address] or by calling [phone].

## 2024-10-29 NOTE — DISCHARGE NOTE NURSING/CASE MANAGEMENT/SOCIAL WORK - PATIENT PORTAL LINK FT
You can access the FollowMyHealth Patient Portal offered by [facility] by registering at the following website: [email or web address]. By joining [employer]’s FollowMyHealth portal, you will also be able to view your health information using other applications (apps) compatible with our system.

## 2024-10-29 NOTE — DISCHARGE NOTE NURSING/CASE MANAGEMENT/SOCIAL WORK - NSDCPEFALRISK_GEN_ALL_CORE
For information on Fall & Injury Prevention, visit: [email or web address] OR  [email or web address] OR  https://www.cdc.gov/steadi/patient.html

## 2024-11-06 ENCOUNTER — APPOINTMENT (OUTPATIENT)
Dept: SURGICAL ONCOLOGY | Facility: CLINIC | Age: 79
End: 2024-11-06
Payer: MEDICARE

## 2024-11-06 VITALS
OXYGEN SATURATION: 99 % | DIASTOLIC BLOOD PRESSURE: 81 MMHG | BODY MASS INDEX: 28.12 KG/M2 | SYSTOLIC BLOOD PRESSURE: 126 MMHG | WEIGHT: 175 LBS | HEIGHT: 66 IN | HEART RATE: 90 BPM | RESPIRATION RATE: 16 BRPM

## 2024-11-06 DIAGNOSIS — K63.89 OTHER SPECIFIED DISEASES OF INTESTINE: ICD-10-CM

## 2024-11-06 DIAGNOSIS — K76.9 LIVER DISEASE, UNSPECIFIED: ICD-10-CM

## 2024-11-06 PROBLEM — Z85.038 PERSONAL HISTORY OF OTHER MALIGNANT NEOPLASM OF LARGE INTESTINE: Chronic | Status: ACTIVE | Noted: 2024-10-24

## 2024-11-06 PROCEDURE — 99214 OFFICE O/P EST MOD 30 MIN: CPT

## 2024-11-18 ENCOUNTER — APPOINTMENT (OUTPATIENT)
Dept: NUCLEAR MEDICINE | Facility: CLINIC | Age: 79
End: 2024-11-18
Payer: MEDICARE

## 2024-11-18 PROCEDURE — A9552: CPT

## 2024-11-18 PROCEDURE — 78815 PET IMAGE W/CT SKULL-THIGH: CPT | Mod: PS

## 2024-11-26 PROCEDURE — 96374 THER/PROPH/DIAG INJ IV PUSH: CPT

## 2024-11-26 PROCEDURE — 85379 FIBRIN DEGRADATION QUANT: CPT

## 2024-11-26 PROCEDURE — 84484 ASSAY OF TROPONIN QUANT: CPT

## 2024-11-26 PROCEDURE — 83690 ASSAY OF LIPASE: CPT

## 2024-11-26 PROCEDURE — 87536 HIV-1 QUANT&REVRSE TRNSCRPJ: CPT

## 2024-11-26 PROCEDURE — 82746 ASSAY OF FOLIC ACID SERUM: CPT

## 2024-11-26 PROCEDURE — 96375 TX/PRO/DX INJ NEW DRUG ADDON: CPT

## 2024-11-26 PROCEDURE — 74177 CT ABD & PELVIS W/CONTRAST: CPT | Mod: MC

## 2024-11-26 PROCEDURE — 93005 ELECTROCARDIOGRAM TRACING: CPT

## 2024-11-26 PROCEDURE — 85384 FIBRINOGEN ACTIVITY: CPT

## 2024-11-26 PROCEDURE — 86360 T CELL ABSOLUTE COUNT/RATIO: CPT

## 2024-11-26 PROCEDURE — 87899 AGENT NOS ASSAY W/OPTIC: CPT

## 2024-11-26 PROCEDURE — 86738 MYCOPLASMA ANTIBODY: CPT

## 2024-11-26 PROCEDURE — 80053 COMPREHEN METABOLIC PANEL: CPT

## 2024-11-26 PROCEDURE — 86357 NK CELLS TOTAL COUNT: CPT

## 2024-11-26 PROCEDURE — 99285 EMERGENCY DEPT VISIT HI MDM: CPT | Mod: 25

## 2024-11-26 PROCEDURE — 84100 ASSAY OF PHOSPHORUS: CPT

## 2024-11-26 PROCEDURE — 83605 ASSAY OF LACTIC ACID: CPT

## 2024-11-26 PROCEDURE — 80048 BASIC METABOLIC PNL TOTAL CA: CPT

## 2024-11-26 PROCEDURE — 36415 COLL VENOUS BLD VENIPUNCTURE: CPT

## 2024-11-26 PROCEDURE — 85027 COMPLETE CBC AUTOMATED: CPT

## 2024-11-26 PROCEDURE — 85025 COMPLETE CBC W/AUTO DIFF WBC: CPT

## 2024-11-26 PROCEDURE — 81001 URINALYSIS AUTO W/SCOPE: CPT

## 2024-11-26 PROCEDURE — 82607 VITAMIN B-12: CPT

## 2024-11-26 PROCEDURE — 82550 ASSAY OF CK (CPK): CPT

## 2024-11-26 PROCEDURE — 87040 BLOOD CULTURE FOR BACTERIA: CPT

## 2024-11-26 PROCEDURE — 86355 B CELLS TOTAL COUNT: CPT

## 2024-11-26 PROCEDURE — 85610 PROTHROMBIN TIME: CPT

## 2024-11-26 PROCEDURE — 85730 THROMBOPLASTIN TIME PARTIAL: CPT

## 2024-11-26 PROCEDURE — 71275 CT ANGIOGRAPHY CHEST: CPT | Mod: MC

## 2024-11-26 PROCEDURE — 86359 T CELLS TOTAL COUNT: CPT

## 2024-11-26 PROCEDURE — 83880 ASSAY OF NATRIURETIC PEPTIDE: CPT

## 2024-11-26 PROCEDURE — 87449 NOS EACH ORGANISM AG IA: CPT

## 2024-11-26 PROCEDURE — 83735 ASSAY OF MAGNESIUM: CPT

## 2024-12-11 RX ORDER — METRONIDAZOLE 250 MG/1
250 TABLET ORAL
Qty: 3 | Refills: 0 | Status: ACTIVE | COMMUNITY
Start: 2024-12-11 | End: 1900-01-01

## 2024-12-11 RX ORDER — PEG-3350, SODIUM SULFATE, SODIUM CHLORIDE, POTASSIUM CHLORIDE, SODIUM ASCORBATE AND ASCORBIC ACID 7.5-2.691G
100 KIT ORAL
Qty: 1 | Refills: 0 | Status: ACTIVE | COMMUNITY
Start: 2024-12-11 | End: 1900-01-01

## 2024-12-11 RX ORDER — NEOMYCIN SULFATE 500 MG/1
500 TABLET ORAL
Qty: 6 | Refills: 0 | Status: ACTIVE | COMMUNITY
Start: 2024-12-11 | End: 1900-01-01

## 2024-12-11 RX ORDER — POTASSIUM CHLORIDE 1500 MG/1
20 TABLET, FILM COATED, EXTENDED RELEASE ORAL
Qty: 4 | Refills: 0 | Status: ACTIVE | COMMUNITY
Start: 2024-12-11 | End: 1900-01-01

## 2025-01-10 ENCOUNTER — APPOINTMENT (OUTPATIENT)
Dept: SURGICAL ONCOLOGY | Facility: HOSPITAL | Age: 80
End: 2025-01-10

## 2025-01-14 ENCOUNTER — OUTPATIENT (OUTPATIENT)
Dept: OUTPATIENT SERVICES | Facility: HOSPITAL | Age: 80
LOS: 1 days | End: 2025-01-14

## 2025-01-14 VITALS
RESPIRATION RATE: 14 BRPM | HEIGHT: 66 IN | TEMPERATURE: 99 F | SYSTOLIC BLOOD PRESSURE: 140 MMHG | DIASTOLIC BLOOD PRESSURE: 88 MMHG | WEIGHT: 166.01 LBS | OXYGEN SATURATION: 97 % | HEART RATE: 78 BPM

## 2025-01-14 DIAGNOSIS — Z91.89 OTHER SPECIFIED PERSONAL RISK FACTORS, NOT ELSEWHERE CLASSIFIED: ICD-10-CM

## 2025-01-14 DIAGNOSIS — B20 HUMAN IMMUNODEFICIENCY VIRUS [HIV] DISEASE: ICD-10-CM

## 2025-01-14 DIAGNOSIS — F05 DELIRIUM DUE TO KNOWN PHYSIOLOGICAL CONDITION: ICD-10-CM

## 2025-01-14 DIAGNOSIS — K63.89 OTHER SPECIFIED DISEASES OF INTESTINE: ICD-10-CM

## 2025-01-14 DIAGNOSIS — Z98.890 OTHER SPECIFIED POSTPROCEDURAL STATES: Chronic | ICD-10-CM

## 2025-01-14 DIAGNOSIS — Z90.89 ACQUIRED ABSENCE OF OTHER ORGANS: Chronic | ICD-10-CM

## 2025-01-14 LAB
A1C WITH ESTIMATED AVERAGE GLUCOSE RESULT: 5.4 % — SIGNIFICANT CHANGE UP (ref 4–5.6)
ANION GAP SERPL CALC-SCNC: 15 MMOL/L — HIGH (ref 7–14)
BLD GP AB SCN SERPL QL: NEGATIVE — SIGNIFICANT CHANGE UP
BUN SERPL-MCNC: 8 MG/DL — SIGNIFICANT CHANGE UP (ref 7–23)
CALCIUM SERPL-MCNC: 9.6 MG/DL — SIGNIFICANT CHANGE UP (ref 8.4–10.5)
CHLORIDE SERPL-SCNC: 105 MMOL/L — SIGNIFICANT CHANGE UP (ref 98–107)
CO2 SERPL-SCNC: 22 MMOL/L — SIGNIFICANT CHANGE UP (ref 22–31)
CREAT SERPL-MCNC: 0.86 MG/DL — SIGNIFICANT CHANGE UP (ref 0.5–1.3)
EGFR: 89 ML/MIN/1.73M2 — SIGNIFICANT CHANGE UP
ESTIMATED AVERAGE GLUCOSE: 108 — SIGNIFICANT CHANGE UP
GLUCOSE SERPL-MCNC: 66 MG/DL — LOW (ref 70–99)
POTASSIUM SERPL-MCNC: 4.1 MMOL/L — SIGNIFICANT CHANGE UP (ref 3.5–5.3)
POTASSIUM SERPL-SCNC: 4.1 MMOL/L — SIGNIFICANT CHANGE UP (ref 3.5–5.3)
RH IG SCN BLD-IMP: POSITIVE — SIGNIFICANT CHANGE UP
RH IG SCN BLD-IMP: POSITIVE — SIGNIFICANT CHANGE UP
SODIUM SERPL-SCNC: 142 MMOL/L — SIGNIFICANT CHANGE UP (ref 135–145)

## 2025-01-14 NOTE — H&P PST ADULT - PROBLEM SELECTOR PLAN 2
Patient follows up with infectious disease q 3 months, seronegative on treatment., next appt- 1/18/25.  Continue HIV medications,

## 2025-01-14 NOTE — H&P PST ADULT - PROBLEM SELECTOR PLAN 1
Patient is tentatively scheduled for Robotic sigmoid colon resection, lithotomy with Dr Lao- 01/24/25.      Pre-op instructions provided. Pt given verbal and written instructions with teach back on chlorhexidine wash and pepcid. Pt verbalized understanding with return demonstration.    CBC,BMP,A1C,T&S ABO sent from PST  Copy of recent EKG and ECHO results requested.( recent chemotherapy).  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    Instructed pt to stop prophylactic aspirin dose 7 days prior to surgery.

## 2025-01-14 NOTE — H&P PST ADULT - NEGATIVE NEUROLOGICAL SYMPTOMS
no transient paralysis/no weakness/no paresthesias/no generalized seizures/no vertigo/no difficulty walking/no headache

## 2025-01-14 NOTE — H&P PST ADULT - HISTORY OF PRESENT ILLNESS
78 year old  male with history of HLD, HIV (viral load <20), reports the above.  Patient states has not had a colonoscopy for approximately nine years, and a routine colonoscopy this year revealed a mass in the left lower side of the intestine on 07/2024. Patient reports he was initially  scheduled for anterior colon resection in 07/2024, how ever surgeon cancelled the surgery, Patient had completed 7 cycles of chemotherapy.- last session on 11/2024. MRI of the liver 7/2024 confirmed liver met. Repeat MRI showed  * Interval decrease in size of right hepatic lobe lesion compared to 7/5/2024 consistent with positive response to treatment, now barely perceptible as a 5 mm focus. No new liver lesion. Now plan for surgery.      **** Patient was admitted at [facility] on 10/24/24 due to PNA and chemo  induced mucositis, and was dcd on 12/29/24.   78 year old  male with history of HLD, HIV (viral load <20), presents to [facility],for pre op evaluation prior to scheduled surgery-Robotic sigmoid colon resection with Dr Lao.  Patient states he  has not had a colonoscopy for approximately nine years, and a routine colonoscopy last year revealed a mass in the left lower side of the intestine on 07/2024. Patient reports he was initially  scheduled for anterior colon resection on 07/2024, however surgery was cancelled and proceed to chemotherapy., Patient had completed 7 cycles of chemotherapy.- last session on 11/2024. MRI of the liver 7/2024 confirmed liver met. Repeat MRI showed  * Interval decrease in size of right hepatic lobe lesion compared to 7/5/2024 consistent with positive response to treatment, now barely perceptible as a 5 mm focus. No new liver lesion. Now plan for surgery.      **** Patient was admitted at [facility] on 10/24/24 due to PNA and chemo  induced mucositis, and was dcd on 12/29/24.

## 2025-01-14 NOTE — H&P PST ADULT - OTHER CARE PROVIDERS
Dr Jessica Cadet- oncologist-                                                      Dr Juan Alberto Causey  [phone]

## 2025-01-14 NOTE — H&P PST ADULT - NS SC CAGE ALCOHOL CUT DOWN
Complaints today: Ms. Pendleton is doing well with no complaints. Normal fetal movements with no vaginal bleeding, LOF or contractions at this time.       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    Dysplasia of cervix, high grade HELENE 2    Encounter for fetal anatomic survey     OB History    Para Term  AB Living   4 3 3     3   SAB TAB Ectopic Multiple Live Births           1      # Outcome Date GA Lbr Jules/2nd Weight Sex Delivery Anes PTL Lv   4 Current            3 Term 02/15/15 40w0d   F Vag-Spont  N    2 Term 12 40w0d   F Vag-Spont  N JULY   1 Term 11/01/10 37w0d   M Vag-Spont             Dating reviewed    Allergies and problem list reviewed and updated    Medical and surgical history reviewed    Prenatal labs reviewed and updated    Physical Exam:  ABD: soft, gravid, nontender, 32CM    Assessment:  Anila was seen today for routine prenatal visit.    Diagnoses and all orders for this visit:    Encounter for supervision of normal pregnancy, antepartum, unspecified   - Bleeding/pain,kick counts, labor precautions given       No orders of the defined types were placed in this encounter.      Follow-up in about 2 weeks (around 2019) for routine OB.      
no

## 2025-01-14 NOTE — H&P PST ADULT - NSICDXPASTSURGICALHX_GEN_ALL_CORE_FT
PAST SURGICAL HISTORY:  Encounter for care related to vascular access port     H/O colonoscopy     H/O endoscopy     History of tonsillectomy

## 2025-01-14 NOTE — H&P PST ADULT - ADDITIONAL PE
denies loose teeth or partial upper and lower dentures  complete visualization of uvula- class 2- mallampati

## 2025-01-14 NOTE — H&P PST ADULT - PROBLEM SELECTOR PLAN 4
Postoperative Delirium Screen    Patient eligible for fortino risk screen age>75?YES    Health care proxy paperwork given to patient? Yes (all patients should be given the packet to fill out at home and return on day of surgery to pre-op RN)    Impaired mobility (ie: uses cane, walker, wheelchair, or assist device)? no    Known dementia diagnosis? no    Impaired functional status (METS<4)? no    Malnutrition BMI<20? no

## 2025-01-14 NOTE — H&P PST ADULT - EYES
conjunctiva clear Mohs Method Verbiage: An incision at a 45 degree angle following the standard Mohs approach was done and the specimen was harvested as a microscopic controlled layer.

## 2025-01-14 NOTE — H&P PST ADULT - GASTROINTESTINAL COMMENTS
patient diagnosed with  rectosigmoid adenocarcinoma, s/p 7 cycles of chemotherapy, completed 11/2024, now plan for surgery pre op dx- rectosigmoid carcinoma

## 2025-01-14 NOTE — H&P PST ADULT - NSICDXPASTMEDICALHX_GEN_ALL_CORE_FT
PAST MEDICAL HISTORY:  H/O hepatitis     History of colon cancer     HIV positive     Hyperlipidemia     Pneumonia     Status post chemotherapy     Thrombocytopenia      PAST MEDICAL HISTORY:  H/O hepatitis     H/O iron deficiency anemia     History of colon cancer     HIV positive     Hyperlipidemia     Liver lesion     Pneumonia     Status post chemotherapy     Thrombocytopenia

## 2025-01-15 LAB
BASOPHILS # BLD AUTO: 0.05 K/UL — SIGNIFICANT CHANGE UP (ref 0–0.2)
BASOPHILS NFR BLD AUTO: 0.6 % — SIGNIFICANT CHANGE UP (ref 0–2)
EOSINOPHIL # BLD AUTO: 0.21 K/UL — SIGNIFICANT CHANGE UP (ref 0–0.5)
EOSINOPHIL NFR BLD AUTO: 2.7 % — SIGNIFICANT CHANGE UP (ref 0–6)
HCT VFR BLD CALC: 44.7 % — SIGNIFICANT CHANGE UP (ref 39–50)
HGB BLD-MCNC: 14.5 G/DL — SIGNIFICANT CHANGE UP (ref 13–17)
IMM GRANULOCYTES NFR BLD AUTO: 0.3 % — SIGNIFICANT CHANGE UP (ref 0–0.9)
LYMPHOCYTES # BLD AUTO: 2 K/UL — SIGNIFICANT CHANGE UP (ref 1–3.3)
LYMPHOCYTES # BLD AUTO: 25.9 % — SIGNIFICANT CHANGE UP (ref 13–44)
MCHC RBC-ENTMCNC: 32.4 G/DL — SIGNIFICANT CHANGE UP (ref 32–36)
MCHC RBC-ENTMCNC: 32.7 PG — SIGNIFICANT CHANGE UP (ref 27–34)
MCV RBC AUTO: 100.7 FL — HIGH (ref 80–100)
MONOCYTES # BLD AUTO: 0.76 K/UL — SIGNIFICANT CHANGE UP (ref 0–0.9)
MONOCYTES NFR BLD AUTO: 9.8 % — SIGNIFICANT CHANGE UP (ref 2–14)
NEUTROPHILS # BLD AUTO: 4.68 K/UL — SIGNIFICANT CHANGE UP (ref 1.8–7.4)
NEUTROPHILS NFR BLD AUTO: 60.7 % — SIGNIFICANT CHANGE UP (ref 43–77)
PLATELET # BLD AUTO: 179 K/UL — SIGNIFICANT CHANGE UP (ref 150–400)
RBC # BLD: 4.44 M/UL — SIGNIFICANT CHANGE UP (ref 4.2–5.8)
RBC # FLD: 12.7 % — SIGNIFICANT CHANGE UP (ref 10.3–14.5)
WBC # BLD: 7.72 K/UL — SIGNIFICANT CHANGE UP (ref 3.8–10.5)
WBC # FLD AUTO: 7.72 K/UL — SIGNIFICANT CHANGE UP (ref 3.8–10.5)

## 2025-01-21 ENCOUNTER — NON-APPOINTMENT (OUTPATIENT)
Age: 80
End: 2025-01-21

## 2025-01-23 NOTE — ASU PATIENT PROFILE, ADULT - PROVIDER NOTIFICATION
TYPE OF PROCEDURE:   Injection sclerotherapy.     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     PROCEDURE:   We targeted the right lower extremity during today's session. The right leg was prepped and cleaned in the usual sterile manner with rubbing alcohol, which was then let to dry. Afterwards I injected multiple venous varicosities at the right calf, lateral and medial ankle. All of these varicosities were injected using a sclerosing solution consisting of 0.5% Asclera solution, of which 10 mL/5 vials were utilized. After each injection the veins were pressed firmly with wet gauze to help with obliteration. After the procedure was completed we applied cotton balls onto the injected varicosities and we kept those in place with paper tape. Then the patient was instructed to apply his compression stockings on top of the cotton balls onto the right lower extremity for the next 24 hours without fail. He can then take them off, shower using lukewarm water and mild soap, and then reapply his stockings and keep the stockings on in the morning and take them off at night.   The patient tolerated the procedure quite well without any immediate complications. He left our office in excellent condition.     Joce Pham MD.      Declines Yes

## 2025-01-23 NOTE — ASU PATIENT PROFILE, ADULT - NSICDXPASTMEDICALHX_GEN_ALL_CORE_FT
PAST MEDICAL HISTORY:  H/O hepatitis     H/O iron deficiency anemia     History of colon cancer     HIV positive     Hyperlipidemia     Liver lesion     Pneumonia     Status post chemotherapy     Thrombocytopenia

## 2025-01-24 ENCOUNTER — TRANSCRIPTION ENCOUNTER (OUTPATIENT)
Age: 80
End: 2025-01-24

## 2025-01-24 ENCOUNTER — INPATIENT (INPATIENT)
Facility: HOSPITAL | Age: 80
LOS: 3 days | Discharge: HOME | End: 2025-01-28
Attending: SURGERY | Admitting: SURGERY
Payer: MEDICARE

## 2025-01-24 ENCOUNTER — APPOINTMENT (OUTPATIENT)
Dept: SURGICAL ONCOLOGY | Facility: HOSPITAL | Age: 80
End: 2025-01-24

## 2025-01-24 VITALS
OXYGEN SATURATION: 100 % | HEART RATE: 88 BPM | DIASTOLIC BLOOD PRESSURE: 87 MMHG | WEIGHT: 166.01 LBS | TEMPERATURE: 98 F | HEIGHT: 66 IN | SYSTOLIC BLOOD PRESSURE: 145 MMHG | RESPIRATION RATE: 16 BRPM

## 2025-01-24 DIAGNOSIS — Z98.890 OTHER SPECIFIED POSTPROCEDURAL STATES: Chronic | ICD-10-CM

## 2025-01-24 DIAGNOSIS — K63.89 OTHER SPECIFIED DISEASES OF INTESTINE: ICD-10-CM

## 2025-01-24 DIAGNOSIS — Z90.89 ACQUIRED ABSENCE OF OTHER ORGANS: Chronic | ICD-10-CM

## 2025-01-24 LAB
ANION GAP SERPL CALC-SCNC: 16 MMOL/L — HIGH (ref 7–14)
APTT BLD: 36.3 SEC — HIGH (ref 24.5–35.6)
BLOOD GAS ARTERIAL COMPREHENSIVE RESULT: SIGNIFICANT CHANGE UP
BUN SERPL-MCNC: 11 MG/DL — SIGNIFICANT CHANGE UP (ref 7–23)
CALCIUM SERPL-MCNC: 9.1 MG/DL — SIGNIFICANT CHANGE UP (ref 8.4–10.5)
CHLORIDE SERPL-SCNC: 108 MMOL/L — HIGH (ref 98–107)
CO2 SERPL-SCNC: 18 MMOL/L — LOW (ref 22–31)
CREAT SERPL-MCNC: 0.95 MG/DL — SIGNIFICANT CHANGE UP (ref 0.5–1.3)
EGFR: 82 ML/MIN/1.73M2 — SIGNIFICANT CHANGE UP
GAS PNL BLDV: SIGNIFICANT CHANGE UP
GLUCOSE BLDC GLUCOMTR-MCNC: 87 MG/DL — SIGNIFICANT CHANGE UP (ref 70–99)
GLUCOSE SERPL-MCNC: 157 MG/DL — HIGH (ref 70–99)
HCT VFR BLD CALC: 46.6 % — SIGNIFICANT CHANGE UP (ref 39–50)
HGB BLD-MCNC: 14.4 G/DL — SIGNIFICANT CHANGE UP (ref 13–17)
INR BLD: 0.97 RATIO — SIGNIFICANT CHANGE UP (ref 0.85–1.16)
LACTATE BLDV-MCNC: 2.5 MMOL/L — HIGH (ref 0.5–2)
MAGNESIUM SERPL-MCNC: 2 MG/DL — SIGNIFICANT CHANGE UP (ref 1.6–2.6)
MCHC RBC-ENTMCNC: 30.9 G/DL — LOW (ref 32–36)
MCHC RBC-ENTMCNC: 31.7 PG — SIGNIFICANT CHANGE UP (ref 27–34)
MCV RBC AUTO: 102.6 FL — HIGH (ref 80–100)
NRBC # BLD AUTO: 0 K/UL — SIGNIFICANT CHANGE UP (ref 0–0)
NRBC # BLD: 0 /100 WBCS — SIGNIFICANT CHANGE UP (ref 0–0)
NRBC # FLD: 0 K/UL — SIGNIFICANT CHANGE UP (ref 0–0)
NRBC BLD-RTO: 0 /100 WBCS — SIGNIFICANT CHANGE UP (ref 0–0)
PHOSPHATE SERPL-MCNC: 5.3 MG/DL — HIGH (ref 2.5–4.5)
PLATELET # BLD AUTO: 164 K/UL — SIGNIFICANT CHANGE UP (ref 150–400)
POTASSIUM SERPL-MCNC: 5 MMOL/L — SIGNIFICANT CHANGE UP (ref 3.5–5.3)
POTASSIUM SERPL-SCNC: 5 MMOL/L — SIGNIFICANT CHANGE UP (ref 3.5–5.3)
PROTHROM AB SERPL-ACNC: 11.6 SEC — SIGNIFICANT CHANGE UP (ref 9.9–13.4)
RBC # BLD: 4.54 M/UL — SIGNIFICANT CHANGE UP (ref 4.2–5.8)
RBC # FLD: 12.3 % — SIGNIFICANT CHANGE UP (ref 10.3–14.5)
SODIUM SERPL-SCNC: 142 MMOL/L — SIGNIFICANT CHANGE UP (ref 135–145)
WBC # BLD: 17.99 K/UL — HIGH (ref 3.8–10.5)
WBC # FLD AUTO: 17.99 K/UL — HIGH (ref 3.8–10.5)

## 2025-01-24 PROCEDURE — 88309 TISSUE EXAM BY PATHOLOGIST: CPT | Mod: 26

## 2025-01-24 PROCEDURE — 44207 L COLECTOMY/COLOPROCTOSTOMY: CPT | Mod: 82

## 2025-01-24 PROCEDURE — 88307 TISSUE EXAM BY PATHOLOGIST: CPT | Mod: 26

## 2025-01-24 PROCEDURE — 88341 IMHCHEM/IMCYTCHM EA ADD ANTB: CPT | Mod: 26

## 2025-01-24 PROCEDURE — 99222 1ST HOSP IP/OBS MODERATE 55: CPT | Mod: GC

## 2025-01-24 PROCEDURE — 38747 REMOVE ABDOMINAL LYMPH NODES: CPT

## 2025-01-24 PROCEDURE — S2900 ROBOTIC SURGICAL SYSTEM: CPT | Mod: NC

## 2025-01-24 PROCEDURE — 44213 LAP MOBIL SPLENIC FL ADD-ON: CPT | Mod: 82

## 2025-01-24 PROCEDURE — 49905 OMENTAL FLAP INTRA-ABDOM: CPT

## 2025-01-24 PROCEDURE — 88305 TISSUE EXAM BY PATHOLOGIST: CPT | Mod: 26

## 2025-01-24 PROCEDURE — 88342 IMHCHEM/IMCYTCHM 1ST ANTB: CPT | Mod: 26

## 2025-01-24 PROCEDURE — 50715 RELEASE OF URETER: CPT | Mod: LT,XU

## 2025-01-24 PROCEDURE — G0545: CPT

## 2025-01-24 PROCEDURE — 44207 L COLECTOMY/COLOPROCTOSTOMY: CPT

## 2025-01-24 PROCEDURE — 44213 LAP MOBIL SPLENIC FL ADD-ON: CPT

## 2025-01-24 PROCEDURE — S2900 ROBOTIC SURGICAL SYSTEM: CPT | Mod: NC,82

## 2025-01-24 DEVICE — STAPLER COVIDIEN ENDO GIA 45MM GREY RELOAD: Type: IMPLANTABLE DEVICE | Status: FUNCTIONAL

## 2025-01-24 DEVICE — SURGICEL 2 X 14": Type: IMPLANTABLE DEVICE | Status: FUNCTIONAL

## 2025-01-24 DEVICE — STAPLER COVIDIEN TRI-STAPLE 45MM PURPLE RELOAD: Type: IMPLANTABLE DEVICE | Status: FUNCTIONAL

## 2025-01-24 DEVICE — LIGATING CLIPS WECK HEMOLOK POLYMER LARGE (PURPLE) 6: Type: IMPLANTABLE DEVICE | Status: FUNCTIONAL

## 2025-01-24 DEVICE — STAPLER ECHELON CURVED 35MM: Type: IMPLANTABLE DEVICE | Status: FUNCTIONAL

## 2025-01-24 DEVICE — STAPLER COVIDIEN CIRCULAR TRI-STAPLE 28MM BLACK MEDIUM/THICK XL: Type: IMPLANTABLE DEVICE | Status: FUNCTIONAL

## 2025-01-24 DEVICE — STAPLER COVIDIEN TRI-STAPLE 60MM PURPLE RELOAD: Type: IMPLANTABLE DEVICE | Status: FUNCTIONAL

## 2025-01-24 DEVICE — AGENT HEMOSTATIC HEMOBLAST 1.65G 10CM: Type: IMPLANTABLE DEVICE | Status: FUNCTIONAL

## 2025-01-24 RX ORDER — DOLUTEGRAVIR SODIUM 25 MG/1
50 TABLET, FILM COATED ORAL DAILY
Refills: 0 | Status: DISCONTINUED | OUTPATIENT
Start: 2025-01-25 | End: 2025-01-28

## 2025-01-24 RX ORDER — FENTANYL CITRATE 50 UG/ML
50 INJECTION INTRAMUSCULAR; INTRAVENOUS
Refills: 0 | Status: DISCONTINUED | OUTPATIENT
Start: 2025-01-24 | End: 2025-01-24

## 2025-01-24 RX ORDER — ACETAMINOPHEN 160 MG/5ML
1000 SUSPENSION ORAL EVERY 6 HOURS
Refills: 0 | Status: COMPLETED | OUTPATIENT
Start: 2025-01-24 | End: 2025-01-25

## 2025-01-24 RX ORDER — OXYCODONE HYDROCHLORIDE 30 MG/1
5 TABLET ORAL ONCE
Refills: 0 | Status: DISCONTINUED | OUTPATIENT
Start: 2025-01-24 | End: 2025-01-24

## 2025-01-24 RX ORDER — SODIUM CHLORIDE 9 G/ML
1000 INJECTION, SOLUTION INTRAVENOUS
Refills: 0 | Status: DISCONTINUED | OUTPATIENT
Start: 2025-01-24 | End: 2025-01-27

## 2025-01-24 RX ORDER — HYDROMORPHONE HYDROCHLORIDE 4 MG/ML
0.5 INJECTION, SOLUTION INTRAMUSCULAR; INTRAVENOUS; SUBCUTANEOUS
Refills: 0 | Status: DISCONTINUED | OUTPATIENT
Start: 2025-01-24 | End: 2025-01-27

## 2025-01-24 RX ORDER — NALOXONE HYDROCHLORIDE 3 MG/.1ML
0.1 SPRAY NASAL
Refills: 0 | Status: DISCONTINUED | OUTPATIENT
Start: 2025-01-24 | End: 2025-01-28

## 2025-01-24 RX ORDER — FENTANYL CITRATE 50 UG/ML
25 INJECTION INTRAMUSCULAR; INTRAVENOUS
Refills: 0 | Status: DISCONTINUED | OUTPATIENT
Start: 2025-01-24 | End: 2025-01-24

## 2025-01-24 RX ORDER — HYDROMORPHONE HYDROCHLORIDE 4 MG/ML
30 INJECTION, SOLUTION INTRAMUSCULAR; INTRAVENOUS; SUBCUTANEOUS
Refills: 0 | Status: DISCONTINUED | OUTPATIENT
Start: 2025-01-24 | End: 2025-01-24

## 2025-01-24 RX ORDER — CEFOTETAN DISODIUM 2 G
2 VIAL (EA) INJECTION EVERY 12 HOURS
Refills: 0 | Status: COMPLETED | OUTPATIENT
Start: 2025-01-24 | End: 2025-01-25

## 2025-01-24 RX ORDER — SODIUM CHLORIDE 9 G/ML
500 INJECTION, SOLUTION INTRAVENOUS ONCE
Refills: 0 | Status: COMPLETED | OUTPATIENT
Start: 2025-01-24 | End: 2025-01-24

## 2025-01-24 RX ORDER — ENOXAPARIN SODIUM 100 MG/ML
40 INJECTION SUBCUTANEOUS EVERY 24 HOURS
Refills: 0 | Status: DISCONTINUED | OUTPATIENT
Start: 2025-01-24 | End: 2025-01-28

## 2025-01-24 RX ORDER — DARUNAVIR, COBICISTAT, EMTRICITABINE, AND TENOFOVIR ALAFENAMIDE 800; 150; 200; 10 MG/1; MG/1; MG/1; MG/1
1 TABLET, FILM COATED ORAL DAILY
Refills: 0 | Status: DISCONTINUED | OUTPATIENT
Start: 2025-01-25 | End: 2025-01-28

## 2025-01-24 RX ORDER — HYDROMORPHONE HYDROCHLORIDE 4 MG/ML
30 INJECTION, SOLUTION INTRAMUSCULAR; INTRAVENOUS; SUBCUTANEOUS
Refills: 0 | Status: DISCONTINUED | OUTPATIENT
Start: 2025-01-24 | End: 2025-01-27

## 2025-01-24 RX ORDER — ONDANSETRON 4 MG/1
4 TABLET, ORALLY DISINTEGRATING ORAL EVERY 6 HOURS
Refills: 0 | Status: DISCONTINUED | OUTPATIENT
Start: 2025-01-24 | End: 2025-01-28

## 2025-01-24 RX ORDER — SODIUM CHLORIDE 9 G/ML
1000 INJECTION, SOLUTION INTRAVENOUS
Refills: 0 | Status: DISCONTINUED | OUTPATIENT
Start: 2025-01-24 | End: 2025-01-24

## 2025-01-24 RX ORDER — ANTISEPTIC SURGICAL SCRUB 0.04 MG/ML
1 SOLUTION TOPICAL ONCE
Refills: 0 | Status: COMPLETED | OUTPATIENT
Start: 2025-01-24 | End: 2025-01-24

## 2025-01-24 RX ORDER — HYDROMORPHONE HYDROCHLORIDE 4 MG/ML
0.5 INJECTION, SOLUTION INTRAMUSCULAR; INTRAVENOUS; SUBCUTANEOUS
Refills: 0 | Status: DISCONTINUED | OUTPATIENT
Start: 2025-01-24 | End: 2025-01-24

## 2025-01-24 RX ORDER — SODIUM CHLORIDE 9 G/ML
1000 INJECTION, SOLUTION INTRAVENOUS ONCE
Refills: 0 | Status: COMPLETED | OUTPATIENT
Start: 2025-01-24 | End: 2025-01-24

## 2025-01-24 RX ORDER — ASPIRIN 81 MG/1
81 TABLET, COATED ORAL DAILY
Refills: 0 | Status: DISCONTINUED | OUTPATIENT
Start: 2025-01-25 | End: 2025-01-28

## 2025-01-24 RX ADMIN — SODIUM CHLORIDE 1000 MILLILITER(S): 9 INJECTION, SOLUTION INTRAVENOUS at 17:36

## 2025-01-24 RX ADMIN — SODIUM CHLORIDE 40 MILLILITER(S): 9 INJECTION, SOLUTION INTRAVENOUS at 15:57

## 2025-01-24 RX ADMIN — ENOXAPARIN SODIUM 40 MILLIGRAM(S): 100 INJECTION SUBCUTANEOUS at 23:31

## 2025-01-24 RX ADMIN — ANTISEPTIC SURGICAL SCRUB 1 APPLICATION(S): 0.04 SOLUTION TOPICAL at 07:32

## 2025-01-24 RX ADMIN — Medication 100 GRAM(S): at 23:30

## 2025-01-24 RX ADMIN — HYDROMORPHONE HYDROCHLORIDE 30 MILLILITER(S): 4 INJECTION, SOLUTION INTRAMUSCULAR; INTRAVENOUS; SUBCUTANEOUS at 19:42

## 2025-01-24 RX ADMIN — SODIUM CHLORIDE 30 MILLILITER(S): 9 INJECTION, SOLUTION INTRAVENOUS at 07:33

## 2025-01-24 RX ADMIN — SODIUM CHLORIDE 1000 MILLILITER(S): 9 INJECTION, SOLUTION INTRAVENOUS at 15:57

## 2025-01-24 RX ADMIN — HYDROMORPHONE HYDROCHLORIDE 30 MILLILITER(S): 4 INJECTION, SOLUTION INTRAMUSCULAR; INTRAVENOUS; SUBCUTANEOUS at 15:58

## 2025-01-24 RX ADMIN — ACETAMINOPHEN 400 MILLIGRAM(S): 160 SUSPENSION ORAL at 23:30

## 2025-01-24 RX ADMIN — SODIUM CHLORIDE 75 MILLILITER(S): 9 INJECTION, SOLUTION INTRAVENOUS at 15:01

## 2025-01-24 NOTE — PATIENT PROFILE ADULT - NSTOBACCONEVERSMOKERY/N_GEN_A
[facility] Daily/Progress Note    Patient Name: Cherri Lee  Date of Admission: 2024  YOB: 2022     Cherri Lee is a 2 day old  male infant born at Gestational Age: 36w4d to a 33 year old ,   mother.     Hospital Course/Updates as of 11:51 AM, 24: Patient tolerating feeds well. Stooling and voiding appropriately. Echo obtained for limited cardiac views on prenatal US. ECHO results= PDA is moderate in size with moderate left-to-right shunt, peak gradient 24 mmHg. Cardiology consulted, will appreciate recs. Vitals appropriate today. Needs car seat test.     Measurements:  Birth Weight: 2440 g  Most Recent:  Weight: 2315 g  Pct Wt Change: -5.13 %  Pct Birth Wt: 94.87 %  Dosing Weight: 2440 g  Length: 20\" (50.8 cm) (Filed from Delivery Summary)  Head Circumference: 32.5 cm (12.8\") (Filed from Delivery Summary)  BSA (Calculated - sq m): 0.19  BMI (Calculated): 8.97  Classification: AGA    Feeding Preference: Breastfeeding    Intake/Output          0700   0659  0700  / 0659          Unmeasured Urine Occurrence 2 x     Unmeasured Stool Occurrence 2 x             Hyperbilirubinemia:  Risk factors: Breastfeeding  and Gestational age 35-36 weeks  HOL 48, 6.8 mg/dL LL 11.2  Recommendations per 202 AAP Hyperbilirubinemia Guidelines: Bilirubin level is 5.5-6.9 mg/dL below phototherapy threshold and age is <72 hours old. Discharge follow-up recommended within 2 days.     Additional Labs: None     SCREENINGS / IMMUNIZATIONS  Congenital Heart Screen Congenital Heart Disease Screening  Screening Completed: Done  Right Upper Extremity Reading (%): 98 %  Foot Reading (%): 98 %  Foot Tested: Left  Congenital Heart Disease Screening Result: Normal    Hearing Screen  Hearing Test Machine: Auditory Brainstem Response (Algo)  [facility] Hearing Test Results: Pass R, Pass L, Final result   Hepatitis B Vaccine Immunization History   Administered Date(s)  Administered    None   Deferred Date(s) Deferred    Hep B, adolescent or pediatric 2024      No results found for: \"BIO\", \"HTHY\", \"AMOACD\", \"FAO\", \"CYF\", \"SCIDIL\", \"LSDS\", \"ALKDY\"       EXAM:  Temp:  [97.7 °F (36.5 °C)-98.4 °F (36.9 °C)] 97.9 °F (36.6 °C)  Heart Rate:  [128-132] 128  Resp:  [40-44] 40    GENERAL: Harshil Harley is an alert, vigorous male with appropriate behavior. He is in no acute distress.  SKIN: His skin is warm with normal turgor. The color of the skin is pink. There is no rash. There are no bruises or other signs of injury. Significant jaundice is not present.Cafe au lait spot on L flank  HEAD: The head is atraumatic and normocephalic. The anterior fontanel is open and flat.  EYES: The conjunctivae appear normal with neither icterus nor subconjunctival hemorrhage. Red reflexes are seen bilaterally.  EARS: Pinnae normal. No ear pits or tags.   NOSE: There is no nasal flaring, nares patent bilaterally.  THROAT: The oropharynx is normal. No cleft lip or palate.   NECK: Clavicles without crepitus.  TRUNK AND THORAX: There are no lesions on the trunk; there is no dimple over the presacral area. LUNGS: The lung fields are clear to auscultation. There are no retractions.  HEART: The precordium is quiet. The heart rhythm is grossly regular. S1 and S2 are normal. There are no murmurs. Normal femoral pulses.  ABDOMEN: The umbilical cord stump is normal. There is not an umbilical hernia. The abdomen is flat and soft.   GENITALIA: normal male genitalia with bilateral descended testes  RECTAL: Anus patent  MSK: Moving all 4 extremities. The hip exam is normal. There are no hip clicks or clunks.  10 fingers, 10 toes.   NEUROLOGIC: He displays normal tone throughout. He is not jittery. Normal  reflexes (suck, amanda, grasp, palmar, plantar)    Assessment:  Well 2 day old male infant born at 36w4d via . He is tolerating feeds and voiding and stooling appropriately. Birth weight is  decreased by 5%. Patient total bilirubin at 24 HOL is 6.8. No confirmation with TSB, escalation of care, or exchange transfusion required. Passed NSB and CCHD. Has red reflexes bilaterally. Echo showed PFO and moderate PDA. Cardiology consulted.     Patient Active Problem List   Diagnosis    Twin birth delivered by  section in hospital (CMD)    Monochorionic diamniotic twin pregnancy (CMD)    36 weeks gestation of pregnancy (CMD)    SGA (small for gestational age) (CMD)    Abnormal prenatal ultrasound    Café au lait spot       Plan:  Hearing screen, circumcision, and car seat screen prior to discharge.  Consult cardiology regarding Echo findings of moderate PDA.  Encourage feeds q2-3 hrs  Appointment with PCP made for 5/15/24. Encouraged mom to call repeatedly for an earlier appointment or to go to walk-in clinic [facility] for earlier appointment.  TcB in AM , needed f/u TcB in 1-2 days    Discussed with nursing staff, family, and attending physician. All in agreement with plan. All questions answered.     Coni Banks, OMS-3        Yenny Diaz DO PGY-2  [facility] Pediatric Resident   Please contact via [employer]   2024      11:51 AM              No

## 2025-01-24 NOTE — ASU PREOP CHECKLIST - SELECT TESTS ORDERED
BMP/CBC/Type and Cross/Type and Screen/POCT Blood Glucose 87@ 6:25am/BMP/CBC/Type and Cross/Type and Screen/POCT Blood Glucose

## 2025-01-24 NOTE — CONSULT NOTE ADULT - ASSESSMENT
Impression/Hospital Course: 78 year old  male with history of Stabge IV colon ca, HIV (viral load undetectable, CD), HLD presents for elective robotic sigmoid colon resection. ID consulted for management of HIV      Assessment:  #HIV - Patient diagnosed with HIV 2016. Has been on Symtuza and tivicay (Follow with Dr. Avendano, last seen 12/11, VL undetectable)  #Stage IV colon cancer s/p robotic sigmoid resection 1/24/25      Recommendations:   - Continue with patients HIV regimen - symtuza and tivicay   - Patient to follow up with Dr. Avendano after discharge  - Post- op management per surgery team     Case discussed with attending. Recommendations made to the surgery team.     Tiffany Bojorquez DO, PGY-4  Infectious Disease Fellow  [employer] Teams Preferred  After 5pm/weekends call [phone]

## 2025-01-24 NOTE — BRIEF OPERATIVE NOTE - NSICDXBRIEFPREOP_GEN_ALL_CORE_FT
Stephanie from [facility] authorizing Ortho    Triage please book WORK COMP INITIAL VISIT and advise coordinator of date      PRE-OP DIAGNOSIS:  Colon cancer 24-Jan-2025 15:07:06  Yamilet Quintero

## 2025-01-24 NOTE — CONSULT NOTE ADULT - ATTENDING COMMENTS
This is a 77 y/o M w/ PMHx of HLD, HIV (diagnosed with 2016, well controlled on Symtuza, Tivicay) admitted to [facility] on 1/24/25 for elective robotic sigmoid colon resection, now s/p surgery on 1/24.   Pt s/p 7 cycles of chemotherapy, last 11/24, noted to have liver met on MRI, now noted to be improved.     #HIV, well controlled   #Colon CA w/ met to liver, s/p chemo, robotic assocated laparoscopic LAR  #Leukocytosis, possibly reactive in the setting of surgery    Pt well history of well controlled HIV, diagnosed in 2016, follows w/ Dr. Juan Alberto Causey, currently on Symtuza and Tivicay, doesn't miss any doses, however missed about 7 days prior to surgery. Per patient's wife, last VL was checked last month, was UD.     Recommendations:  1. C/w Symtuza, Tivicay for HIV   2. No need to check VL or CD4 while admitted  3. Outpatient follow up Dr. Avendano   4. Leukocytosis likely reactive given surgery, continue to trend. If remains high, can obtain BCx    Thank you for this consult. Inpatient ID team will follow.    Adarsh Matos M.D.  Attending Physician  Division of Infectious Diseases  Department of Medicine    Please contact through MS Teams message.  Office: [phone] (after 5 PM or weekend)

## 2025-01-24 NOTE — BRIEF OPERATIVE NOTE - NSICDXBRIEFPROCEDURE_GEN_ALL_CORE_FT
PROCEDURES:  Robot-assisted laparoscopic low anterior resection using da Aileen Xi 24-Jan-2025 15:06:45  Yamilet Quintero  Flexible sigmoidoscopy 24-Jan-2025 15:11:22  Yamilet Quintero  Mobilization of splenic flexure 24-Jan-2025 15:11:58  Yamilet Quintero

## 2025-01-24 NOTE — BRIEF OPERATIVE NOTE - OPERATION/FINDINGS
Robot-assisted laparoscopic LAR  Very medial L ureter requiring additional RP dissection  Tumor difficult to visualize due to very small size, flexible sigmoidoscopy showing very small tumor more proximal to tattooed site  Anastomosis 10cm from anal verge  Negative leak test

## 2025-01-24 NOTE — CHART NOTE - NSCHARTNOTEFT_GEN_A_CORE
SURGERY POST OP CHECK    STATUS POST PROCEDURE: robotic LAR. splenic flexure mobilization, anastomosis     SUBJECTIVE: Pt seen and examined at bedside. Patient reports appropriate surgical incisional pain; pain is controlled. Denies fevers/chills, chest pain, dyspnea, nausea, vomiting   Pt has not passed gas or had bowel movement yet. Pt is voiding well. Pt is tolerating diet. Pt is ambulating well    --------------------------------------------------------------------------------------------------------------------------------------------------------------------------------------------------------------  OBJECTIVE:  Vital Signs Last 24 Hrs  T(C): 36.3 (24 Jan 2025 20:00), Max: 37.1 (24 Jan 2025 18:29)  T(F): 97.3 (24 Jan 2025 20:00), Max: 98.8 (24 Jan 2025 18:29)  HR: 99 (24 Jan 2025 20:00) (88 - 99)  BP: 170/86 (24 Jan 2025 20:00) (139/78 - 171/93)  BP(mean): 116 (24 Jan 2025 19:00) (97 - 116)  RR: 17 (24 Jan 2025 20:00) (12 - 20)  SpO2: 97% (24 Jan 2025 20:00) (96% - 100%)    Parameters below as of 24 Jan 2025 20:00  Patient On (Oxygen Delivery Method): room air      I&O's Summary    24 Jan 2025 07:01  -  24 Jan 2025 22:59  --------------------------------------------------------  IN: 1670 mL / OUT: 2060 mL / NET: -390 mL        PHYSICAL EXAM:  Constitutional: Well developed, well nourished, NAD  Chest: Symmetric chest rise bilaterally,   Abdomen: Soft, nontender, nondistended, appropriate sx incisional tenderness with port dressings c/d/i. No rebound or guarding.   Groin: Soft, nontender, no ecchymosis/hematoma, no erythema, no edema.  Extremities: Warm to touch. No cyanosis/erythema/edema/hematoma  ----------------------------------------------------------------------------------------------------------------------------------------------------------------------------------------------------------------  ASSESSMENT: HPI:  78M with history of HLD, HIV (viral load <20) initially scheduled for anterior colon resection on 07/2024, however surgery was cancelled and proceed to chemotherapy., Patient had completed 7 cycles of chemotherapy.- last session on 11/2024. MRI of the liver 7/2024 confirmed liver met. Repeat MRI showed  * Interval decrease in size of right hepatic lobe lesion compared to 7/5/2024 consistent with positive response to treatment, now barely perceptible as a 5 mm focus now s/p robotic LAR, splenic flexure mobilization, anastamosis on 1/24/25.   .    PLAN:  - Scott   - Diet: CLD   - ID consult   - Analgesia and antiemetics as needed; PCA   - ABX: cefotetan for 24h   - Strict I&O's  - Monitor bowel function   - Incentive spirometry  - OOB as tolerated  - DVT prophylaxis: SCD, lovenox   - Monitor overnight  - Dispo: Floor

## 2025-01-24 NOTE — PATIENT PROFILE ADULT - FUNCTIONAL ASSESSMENT - BASIC MOBILITY 6.
4-calculated by average /Not able to assess (calculate score using [facility] averaging method)

## 2025-01-24 NOTE — BRIEF OPERATIVE NOTE - SPECIMENS
Rectosigmoid colon, proximal colon margin, additional mesentery lymph node, KEYUR lymph nodes, proximal and distal donuts

## 2025-01-24 NOTE — CONSULT NOTE ADULT - SUBJECTIVE AND OBJECTIVE BOX
INFECTIOUS DISEASE CONSULT NOTE    Patient is a 79y old  Male who presents with a chief complaint of to remove colon (14 Jan 2025 14:08)    HPI:  78 year old  male with history of HLD, HIV (viral load <20), presents for elective Robotic sigmoid colon resection with Dr Lao.  Patient states he  has not had a colonoscopy for approximately nine years, and a routine colonoscopy last year revealed a mass in the left lower side of the intestine on 07/2024. Patient reports he was initially  scheduled for anterior colon resection on 07/2024, however surgery was cancelled and proceed to chemotherapy., Patient had completed 7 cycles of chemotherapy.- last session on 11/2024. MRI of the liver 7/2024 confirmed liver met. Repeat MRI showed  * Interval decrease in size of right hepatic lobe lesion compared to 7/5/2024 consistent with positive response to treatment, now barely perceptible as a 5 mm focus. No new liver lesion. Now plan for surgery.       ID consulted for history of HIV    REVIEW OF SYSTEMS:      Prior hospital charts reviewed [Yes]  Primary team notes reviewed [Yes]  Other consultant notes reviewed [Yes]    PAST MEDICAL & SURGICAL HISTORY:  HIV positive  Hyperlipidemia  History of colon cancer  H/O hepatitis  Thrombocytopenia  Pneumonia  Status post chemotherapy  Liver lesion  H/O iron deficiency anemia  History of tonsillectomy  Encounter for care related to vascular access port  H/O endoscopy  H/O colonoscopy          SOCIAL HISTORY:  unable to obtain    FAMILY HISTORY:  No pertinent family history        Allergies:  No Known Allergies      ANTIMICROBIALS:  cefoTEtan  IVPB 2 every 12 hours      ANTIMICROBIALS (past 90 days):  MEDICATIONS  (STANDING):        OTHER MEDS:   MEDICATIONS  (STANDING):  acetaminophen   IVPB .. 1000 every 6 hours  enoxaparin Injectable 40 every 24 hours  fentaNYL    Injectable 25 every 5 minutes PRN  fentaNYL    Injectable 50 every 5 minutes PRN  HYDROmorphone  Injectable 0.5 every 10 minutes PRN  HYDROmorphone PCA (1 mG/mL) 30 PCA Continuous  oxyCODONE    IR 5 once PRN      VITALS:  Vital Signs Last 24 Hrs  T(F): 97.5 (01-24-25 @ 14:30), Max: 97.7 (01-24-25 @ 06:12)    Vital Signs Last 24 Hrs  HR: 94 (01-24-25 @ 15:00) (88 - 94)  BP: 153/91 (01-24-25 @ 15:00) (139/78 - 153/91)  RR: 14 (01-24-25 @ 15:00)  SpO2: 100% (01-24-25 @ 15:00) (100% - 100%)  Wt(kg): --    EXAM:      Labs:            WBC Trend:      Auto Neutrophil #: 4.68 K/uL (01-14-25 @ 14:31)  Auto Neutrophil #: 2.26 K/uL (10-24-24 @ 13:26)  Band Neutrophils %: 6.0 % (10-24-24 @ 13:26)      Creatine Trend:      Liver Biochemical Testing Trend:  Alanine Aminotransferase (ALT/SGPT): 17 (10-25)  Alanine Aminotransferase (ALT/SGPT): 21 (10-24)  Aspartate Aminotransferase (AST/SGOT): 15 (10-25-24 @ 07:45)  Aspartate Aminotransferase (AST/SGOT): 17 (10-24-24 @ 13:26)  Bilirubin Total: 0.7 (10-25)  Bilirubin Total: 1.3 (10-24)      Trend LDH              MICROBIOLOGY:        Urinalysis with Rflx Culture (collected 24 Oct 2024 18:00)    Culture - Blood (collected 24 Oct 2024 17:20)  Source: .Blood BLOOD  Final Report:    No growth at 5 days    Culture - Blood (collected 24 Oct 2024 17:10)  Source: .Blood BLOOD  Final Report:    No growth at 5 days        ABS CD4: 369 cells/uL (10-25-24 @ 07:45)    HIV-1 RNA Quantitative, Viral Load: NOT DET. copies/mL (10-25-24 @ 07:45)  HIV-1 Viral Load Result: NOT DET. (10-25-24 @ 07:45)            A1C with Estimated Average Glucose Result: 5.4 % (01-14-25 @ 19:52)      RADIOLOGY:  imaging below personally reviewed   INFECTIOUS DISEASE CONSULT NOTE    Patient is a 79y old  Male who presents with a chief complaint of to remove colon (14 Jan 2025 14:08)    HPI:  78 year old  male with history of Stabge IV colon ca, HIV (viral load undetectable, CD), HLD presents for elective robotic sigmoid colon resection.  Patient states he  has not had a colonoscopy for approximately nine years, and a routine colonoscopy last year revealed a mass in the left lower side of the intestine on 07/2024. Patient reports he was initially  scheduled for anterior colon resection on 07/2024, however surgery was cancelled and proceed to chemotherapy., Patient had completed 7 cycles of chemotherapy (last session on 11/2024) sonu.    ID consulted for history of HIV    REVIEW OF SYSTEMS:  Constitutional: No fevers  Skin: No rash, no phlebitis	  Eyes: No change in vision	  ENMT: No sore throat  Respiratory: No cough, no SOB  Cardiovascular:  No chest pain  Gastrointestinal: +mild abdominal pain 	  Genitourinary: No dysuria  MSK: No Joint pain  Neurological: No HA        Prior hospital charts reviewed [Yes]  Primary team notes reviewed [Yes]  Other consultant notes reviewed [Yes]    PAST MEDICAL & SURGICAL HISTORY:  HIV positive  Hyperlipidemia  History of colon cancer  H/O hepatitis  Thrombocytopenia  Pneumonia  Status post chemotherapy  Liver lesion  H/O iron deficiency anemia  History of tonsillectomy  Encounter for care related to vascular access port  H/O endoscopy  H/O colonoscopy          SOCIAL HISTORY:  unable to obtain    FAMILY HISTORY:  No pertinent family history        Allergies:  No Known Allergies      ANTIMICROBIALS:  cefoTEtan  IVPB 2 every 12 hours      ANTIMICROBIALS (past 90 days):  MEDICATIONS  (STANDING):        OTHER MEDS:   MEDICATIONS  (STANDING):  acetaminophen   IVPB .. 1000 every 6 hours  enoxaparin Injectable 40 every 24 hours  fentaNYL    Injectable 25 every 5 minutes PRN  fentaNYL    Injectable 50 every 5 minutes PRN  HYDROmorphone  Injectable 0.5 every 10 minutes PRN  HYDROmorphone PCA (1 mG/mL) 30 PCA Continuous  oxyCODONE    IR 5 once PRN      VITALS:  Vital Signs Last 24 Hrs  T(F): 97.5 (01-24-25 @ 14:30), Max: 97.7 (01-24-25 @ 06:12)    Vital Signs Last 24 Hrs  HR: 94 (01-24-25 @ 15:00) (88 - 94)  BP: 153/91 (01-24-25 @ 15:00) (139/78 - 153/91)  RR: 14 (01-24-25 @ 15:00)  SpO2: 100% (01-24-25 @ 15:00) (100% - 100%)  Wt(kg): --    EXAM:  General:  in no acute distress  HEENT: anicteric sclera  CV: +S1/S2, RRR, no murmurs heard  Lungs: No respiratory distress  Abd:  BS4+, Soft  : +watson  Neuro: AAOx3  Ext: no edema  Skin: surgical site with dressing      Labs:            WBC Trend:      Auto Neutrophil #: 4.68 K/uL (01-14-25 @ 14:31)  Auto Neutrophil #: 2.26 K/uL (10-24-24 @ 13:26)  Band Neutrophils %: 6.0 % (10-24-24 @ 13:26)      Creatine Trend:      Liver Biochemical Testing Trend:  Alanine Aminotransferase (ALT/SGPT): 17 (10-25)  Alanine Aminotransferase (ALT/SGPT): 21 (10-24)  Aspartate Aminotransferase (AST/SGOT): 15 (10-25-24 @ 07:45)  Aspartate Aminotransferase (AST/SGOT): 17 (10-24-24 @ 13:26)  Bilirubin Total: 0.7 (10-25)  Bilirubin Total: 1.3 (10-24)      Trend LDH              MICROBIOLOGY:        Urinalysis with Rflx Culture (collected 24 Oct 2024 18:00)    Culture - Blood (collected 24 Oct 2024 17:20)  Source: .Blood BLOOD  Final Report:    No growth at 5 days    Culture - Blood (collected 24 Oct 2024 17:10)  Source: .Blood BLOOD  Final Report:    No growth at 5 days        ABS CD4: 369 cells/uL (10-25-24 @ 07:45)    HIV-1 RNA Quantitative, Viral Load: NOT DET. copies/mL (10-25-24 @ 07:45)  HIV-1 Viral Load Result: NOT DET. (10-25-24 @ 07:45)            A1C with Estimated Average Glucose Result: 5.4 % (01-14-25 @ 19:52)      RADIOLOGY:  imaging below personally reviewed

## 2025-01-24 NOTE — PATIENT PROFILE ADULT - FALL HARM RISK - HARM RISK INTERVENTIONS

## 2025-01-25 LAB
ANION GAP SERPL CALC-SCNC: 13 MMOL/L — SIGNIFICANT CHANGE UP (ref 7–14)
BUN SERPL-MCNC: 8 MG/DL — SIGNIFICANT CHANGE UP (ref 7–23)
CALCIUM SERPL-MCNC: 9 MG/DL — SIGNIFICANT CHANGE UP (ref 8.4–10.5)
CHLORIDE SERPL-SCNC: 103 MMOL/L — SIGNIFICANT CHANGE UP (ref 98–107)
CO2 SERPL-SCNC: 22 MMOL/L — SIGNIFICANT CHANGE UP (ref 22–31)
CREAT SERPL-MCNC: 0.73 MG/DL — SIGNIFICANT CHANGE UP (ref 0.5–1.3)
EGFR: 94 ML/MIN/1.73M2 — SIGNIFICANT CHANGE UP
GLUCOSE SERPL-MCNC: 122 MG/DL — HIGH (ref 70–99)
HCT VFR BLD CALC: 40.5 % — SIGNIFICANT CHANGE UP (ref 39–50)
HGB BLD-MCNC: 12.7 G/DL — LOW (ref 13–17)
MAGNESIUM SERPL-MCNC: 1.8 MG/DL — SIGNIFICANT CHANGE UP (ref 1.6–2.6)
MCHC RBC-ENTMCNC: 31.2 PG — SIGNIFICANT CHANGE UP (ref 27–34)
MCHC RBC-ENTMCNC: 31.4 G/DL — LOW (ref 32–36)
MCV RBC AUTO: 99.5 FL — SIGNIFICANT CHANGE UP (ref 80–100)
NRBC # BLD AUTO: 0 K/UL — SIGNIFICANT CHANGE UP (ref 0–0)
NRBC # BLD: 0 /100 WBCS — SIGNIFICANT CHANGE UP (ref 0–0)
NRBC # FLD: 0 K/UL — SIGNIFICANT CHANGE UP (ref 0–0)
NRBC BLD-RTO: 0 /100 WBCS — SIGNIFICANT CHANGE UP (ref 0–0)
PHOSPHATE SERPL-MCNC: 4.2 MG/DL — SIGNIFICANT CHANGE UP (ref 2.5–4.5)
PLATELET # BLD AUTO: 167 K/UL — SIGNIFICANT CHANGE UP (ref 150–400)
POTASSIUM SERPL-MCNC: 4.1 MMOL/L — SIGNIFICANT CHANGE UP (ref 3.5–5.3)
POTASSIUM SERPL-SCNC: 4.1 MMOL/L — SIGNIFICANT CHANGE UP (ref 3.5–5.3)
RBC # BLD: 4.07 M/UL — LOW (ref 4.2–5.8)
RBC # FLD: 12.2 % — SIGNIFICANT CHANGE UP (ref 10.3–14.5)
SODIUM SERPL-SCNC: 138 MMOL/L — SIGNIFICANT CHANGE UP (ref 135–145)
WBC # BLD: 10.72 K/UL — HIGH (ref 3.8–10.5)
WBC # FLD AUTO: 10.72 K/UL — HIGH (ref 3.8–10.5)

## 2025-01-25 RX ORDER — MAGNESIUM SULFATE 0.8 MEQ/ML
2 AMPUL (ML) INJECTION ONCE
Refills: 0 | Status: COMPLETED | OUTPATIENT
Start: 2025-01-25 | End: 2025-01-25

## 2025-01-25 RX ADMIN — DOLUTEGRAVIR SODIUM 50 MILLIGRAM(S): 25 TABLET, FILM COATED ORAL at 13:43

## 2025-01-25 RX ADMIN — Medication 100 GRAM(S): at 11:06

## 2025-01-25 RX ADMIN — ENOXAPARIN SODIUM 40 MILLIGRAM(S): 100 INJECTION SUBCUTANEOUS at 19:35

## 2025-01-25 RX ADMIN — SODIUM CHLORIDE 40 MILLILITER(S): 9 INJECTION, SOLUTION INTRAVENOUS at 05:10

## 2025-01-25 RX ADMIN — ACETAMINOPHEN 400 MILLIGRAM(S): 160 SUSPENSION ORAL at 13:42

## 2025-01-25 RX ADMIN — Medication 25 GRAM(S): at 14:46

## 2025-01-25 RX ADMIN — ACETAMINOPHEN 400 MILLIGRAM(S): 160 SUSPENSION ORAL at 05:10

## 2025-01-25 RX ADMIN — ACETAMINOPHEN 400 MILLIGRAM(S): 160 SUSPENSION ORAL at 17:48

## 2025-01-25 RX ADMIN — DARUNAVIR, COBICISTAT, EMTRICITABINE, AND TENOFOVIR ALAFENAMIDE 1 TABLET(S): 800; 150; 200; 10 TABLET, FILM COATED ORAL at 13:43

## 2025-01-25 RX ADMIN — HYDROMORPHONE HYDROCHLORIDE 30 MILLILITER(S): 4 INJECTION, SOLUTION INTRAMUSCULAR; INTRAVENOUS; SUBCUTANEOUS at 07:39

## 2025-01-25 RX ADMIN — ACETAMINOPHEN 1000 MILLIGRAM(S): 160 SUSPENSION ORAL at 00:30

## 2025-01-25 RX ADMIN — HYDROMORPHONE HYDROCHLORIDE 30 MILLILITER(S): 4 INJECTION, SOLUTION INTRAMUSCULAR; INTRAVENOUS; SUBCUTANEOUS at 19:11

## 2025-01-25 NOTE — PROGRESS NOTE ADULT - ASSESSMENT
78M with history of HLD, HIV (viral load <20) initially scheduled for anterior colon resection on 07/2024, however surgery was cancelled and proceed to chemotherapy., Patient had completed 7 cycles of chemotherapy.- last session on 11/2024. MRI of the liver 7/2024 confirmed liver met. Repeat MRI showed  * Interval decrease in size of right hepatic lobe lesion compared to 7/5/2024 consistent with positive response to treatment, now barely perceptible as a 5 mm focus now s/p robotic LAR, splenic flexure mobilization, anastamosis on 1/24/25.     PLAN:  - Scott out, TOV   - Diet: CLD   - Analgesia and antiemetics as needed; PCA   - Strict I&O's  - Monitor bowel function   - Incentive spirometry  - OOB as tolerated  - DVT prophylaxis: SCD, lovenox     Surgery D   [id number]

## 2025-01-25 NOTE — PROGRESS NOTE ADULT - SUBJECTIVE AND OBJECTIVE BOX
SURGERY DAILY PROGRESS NOTE    Overnight Events: No acute events overnight    SUBJECTIVE: Patient seen and evaluated on AM rounds. -/- , no complaints.     -----------------------------------------------------------------------------------------------------------------------------------------------------------------------------------------------------------  OBJECTIVE:  Vital Signs Last 24 Hrs  T(C): 36.6 (25 Jan 2025 09:16), Max: 37.1 (24 Jan 2025 18:29)  T(F): 97.8 (25 Jan 2025 09:16), Max: 98.8 (24 Jan 2025 18:29)  HR: 91 (25 Jan 2025 09:16) (91 - 99)  BP: 128/69 (25 Jan 2025 09:16) (128/69 - 171/93)  BP(mean): 116 (24 Jan 2025 19:00) (97 - 116)  RR: 18 (25 Jan 2025 09:16) (12 - 20)  SpO2: 97% (25 Jan 2025 09:16) (96% - 100%)    Parameters below as of 25 Jan 2025 09:16  Patient On (Oxygen Delivery Method): room air      I&O's Detail    24 Jan 2025 07:01  -  25 Jan 2025 07:00  --------------------------------------------------------  IN:    Lactated Ringers: 440 mL    Lactated Ringers Bolus: 1500 mL    Oral Fluid: 170 mL  Total IN: 2110 mL    OUT:    Indwelling Catheter - Urethral (mL): 3160 mL  Total OUT: 3160 mL    Total NET: -1050 mL      25 Jan 2025 07:01  -  25 Jan 2025 13:48  --------------------------------------------------------  IN:    Oral Fluid: 118 mL  Total IN: 118 mL    OUT:    Indwelling Catheter - Urethral (mL): 350 mL  Total OUT: 350 mL    Total NET: -232 mL        Daily     Daily   MEDICATIONS  (STANDING):  acetaminophen   IVPB .. 1000 milliGRAM(s) IV Intermittent every 6 hours  aspirin enteric coated 81 milliGRAM(s) Oral daily  darunavir 800 mG/cobicistat 150 mG/emtricitabine 200 mG/tenofovir alafenamide 10 mG (SYMTUZA) 1 Tablet(s) Oral daily  dolutegravir 50 milliGRAM(s) Oral daily  enoxaparin Injectable 40 milliGRAM(s) SubCutaneous every 24 hours  HYDROmorphone PCA (1 mG/mL) 30 milliLiter(s) PCA Continuous PCA Continuous  influenza  Vaccine (HIGH DOSE) 0.5 milliLiter(s) IntraMuscular once  lactated ringers. 1000 milliLiter(s) (40 mL/Hr) IV Continuous <Continuous>  magnesium sulfate  IVPB 2 Gram(s) IV Intermittent once    MEDICATIONS  (PRN):  HYDROmorphone PCA (1 mG/mL) Rescue Clinician Bolus 0.5 milliGRAM(s) IV Push every 30 minutes PRN Severe Pain (7 - 10)  naloxone Injectable 0.1 milliGRAM(s) IV Push every 3 minutes PRN For ANY of the following changes in patient status:  A. RR LESS THAN 10 breaths per minute, B. Oxygen saturation LESS THAN 90%, C. Sedation score of 6  ondansetron Injectable 4 milliGRAM(s) IV Push every 6 hours PRN Nausea      LABS:                        12.7   10.72 )-----------( 167      ( 25 Jan 2025 04:09 )             40.5     01-25    138  |  103  |  8   ----------------------------<  122[H]  4.1   |  22  |  0.73    Ca    9.0      25 Jan 2025 04:09  Phos  4.2     01-25  Mg     1.80     01-25      PT/INR - ( 24 Jan 2025 15:11 )   PT: 11.6 sec;   INR: 0.97 ratio         PTT - ( 24 Jan 2025 15:11 )  PTT:36.3 sec  Urinalysis Basic - ( 25 Jan 2025 04:09 )    Color: x / Appearance: x / SG: x / pH: x  Gluc: 122 mg/dL / Ketone: x  / Bili: x / Urobili: x   Blood: x / Protein: x / Nitrite: x   Leuk Esterase: x / RBC: x / WBC x   Sq Epi: x / Non Sq Epi: x / Bacteria: x        PHYSICAL EXAM:  Constitutional: Well developed, well nourished, NAD  Chest: Symmetric chest rise bilaterally,   Abdomen: Soft, nontender, nondistended, appropriate sx incisional tenderness with port dressings c/d/i. No rebound or guarding.   Groin: Soft, nontender, no ecchymosis/hematoma, no erythema, no edema.  Extremities: Warm to touch. No cyanosis/erythema/edema/hematoma

## 2025-01-25 NOTE — PROGRESS NOTE ADULT - SUBJECTIVE AND OBJECTIVE BOX
Anesthesia Pain Management Service    SUBJECTIVE: Patient is doing well with IV PCA and no significant problems reported.  Patient states he hardly used the IV PCA and when he did, it helped with his pain.     Pain Scale Score	At rest: _0/10__ 	With Activity: ___ 	[X ] Refer to charted pain scores    THERAPY:    [ ] IV PCA Morphine		[ ] 5 mg/mL	[ ] 1 mg/mL  [X ] IV PCA Hydromorphone	[ ] 5 mg/mL	[X ] 1 mg/mL  [ ] IV PCA Fentanyl		[ ] 50 micrograms/mL    Demand dose __0.2_ lockout __6_ (minutes) Continuous Rate _0__ Total: _2.2__  mg used (in past 24 hours)      MEDICATIONS  (STANDING):  acetaminophen   IVPB .. 1000 milliGRAM(s) IV Intermittent every 6 hours  aspirin enteric coated 81 milliGRAM(s) Oral daily  darunavir 800 mG/cobicistat 150 mG/emtricitabine 200 mG/tenofovir alafenamide 10 mG (SYMTUZA) 1 Tablet(s) Oral daily  dolutegravir 50 milliGRAM(s) Oral daily  enoxaparin Injectable 40 milliGRAM(s) SubCutaneous every 24 hours  HYDROmorphone PCA (1 mG/mL) 30 milliLiter(s) PCA Continuous PCA Continuous  influenza  Vaccine (HIGH DOSE) 0.5 milliLiter(s) IntraMuscular once  lactated ringers. 1000 milliLiter(s) (40 mL/Hr) IV Continuous <Continuous>  magnesium sulfate  IVPB 2 Gram(s) IV Intermittent once    MEDICATIONS  (PRN):  HYDROmorphone PCA (1 mG/mL) Rescue Clinician Bolus 0.5 milliGRAM(s) IV Push every 30 minutes PRN Severe Pain (7 - 10)  naloxone Injectable 0.1 milliGRAM(s) IV Push every 3 minutes PRN For ANY of the following changes in patient status:  A. RR LESS THAN 10 breaths per minute, B. Oxygen saturation LESS THAN 90%, C. Sedation score of 6  ondansetron Injectable 4 milliGRAM(s) IV Push every 6 hours PRN Nausea      OBJECTIVE:  Patient is sitting up in bed eating jello.  Patient has a multiple gauze site including umbilical areas on abdomen, with slight tenderness on palpation.    Sedation Score:	[ X] Alert	 [ ] Drowsy 	[ ] Arousable	[ ] Asleep	[ ] Unresponsive    Side Effects:	[X ] None	[ ] Nausea	[ ] Vomiting	[ ] Pruritus  		[ ] Other:    Vital Signs Last 24 Hrs  T(C): 36.6 (25 Jan 2025 09:16), Max: 37.1 (24 Jan 2025 18:29)  T(F): 97.8 (25 Jan 2025 09:16), Max: 98.8 (24 Jan 2025 18:29)  HR: 91 (25 Jan 2025 09:16) (91 - 99)  BP: 128/69 (25 Jan 2025 09:16) (128/69 - 171/93)  BP(mean): 116 (24 Jan 2025 19:00) (97 - 116)  RR: 18 (25 Jan 2025 09:16) (12 - 20)  SpO2: 97% (25 Jan 2025 09:16) (96% - 100%)    Parameters below as of 25 Jan 2025 09:16  Patient On (Oxygen Delivery Method): room air        ASSESSMENT/ PLAN    Therapy to  be:	[ X] Continue   [ ] Discontinued   [ ] Change to prn Analgesics    Documentation and Verification of current medications:   [X] Done	[ ] Not done, not elligible    Comments: Patient is post op day 1, will continue with IV Dilaudid PCA.  Recommend non-opioid adjuvant analgesics to be used when possible and when allowed by primary surgical team.    Progress Note written now but Patient was seen earlier.

## 2025-01-26 LAB
ANION GAP SERPL CALC-SCNC: 11 MMOL/L — SIGNIFICANT CHANGE UP (ref 7–14)
BUN SERPL-MCNC: 4 MG/DL — LOW (ref 7–23)
CALCIUM SERPL-MCNC: 8.8 MG/DL — SIGNIFICANT CHANGE UP (ref 8.4–10.5)
CHLORIDE SERPL-SCNC: 105 MMOL/L — SIGNIFICANT CHANGE UP (ref 98–107)
CO2 SERPL-SCNC: 26 MMOL/L — SIGNIFICANT CHANGE UP (ref 22–31)
CREAT SERPL-MCNC: 0.64 MG/DL — SIGNIFICANT CHANGE UP (ref 0.5–1.3)
EGFR: 98 ML/MIN/1.73M2 — SIGNIFICANT CHANGE UP
GLUCOSE SERPL-MCNC: 105 MG/DL — HIGH (ref 70–99)
HCT VFR BLD CALC: 38.6 % — LOW (ref 39–50)
HGB BLD-MCNC: 12.8 G/DL — LOW (ref 13–17)
MAGNESIUM SERPL-MCNC: 1.9 MG/DL — SIGNIFICANT CHANGE UP (ref 1.6–2.6)
MCHC RBC-ENTMCNC: 31.8 PG — SIGNIFICANT CHANGE UP (ref 27–34)
MCHC RBC-ENTMCNC: 33.2 G/DL — SIGNIFICANT CHANGE UP (ref 32–36)
MCV RBC AUTO: 95.8 FL — SIGNIFICANT CHANGE UP (ref 80–100)
NRBC # BLD AUTO: 0 K/UL — SIGNIFICANT CHANGE UP (ref 0–0)
NRBC # BLD: 0 /100 WBCS — SIGNIFICANT CHANGE UP (ref 0–0)
NRBC # FLD: 0 K/UL — SIGNIFICANT CHANGE UP (ref 0–0)
NRBC BLD-RTO: 0 /100 WBCS — SIGNIFICANT CHANGE UP (ref 0–0)
PHOSPHATE SERPL-MCNC: 2.3 MG/DL — LOW (ref 2.5–4.5)
PLATELET # BLD AUTO: 163 K/UL — SIGNIFICANT CHANGE UP (ref 150–400)
POTASSIUM SERPL-MCNC: 3.7 MMOL/L — SIGNIFICANT CHANGE UP (ref 3.5–5.3)
POTASSIUM SERPL-SCNC: 3.7 MMOL/L — SIGNIFICANT CHANGE UP (ref 3.5–5.3)
RBC # BLD: 4.03 M/UL — LOW (ref 4.2–5.8)
RBC # FLD: 11.9 % — SIGNIFICANT CHANGE UP (ref 10.3–14.5)
SODIUM SERPL-SCNC: 142 MMOL/L — SIGNIFICANT CHANGE UP (ref 135–145)
WBC # BLD: 9.52 K/UL — SIGNIFICANT CHANGE UP (ref 3.8–10.5)
WBC # FLD AUTO: 9.52 K/UL — SIGNIFICANT CHANGE UP (ref 3.8–10.5)

## 2025-01-26 RX ORDER — POTASSIUM PHOSPHATE, MONOBASIC POTASSIUM PHOSPHATE, DIBASIC 236; 224 MG/ML; MG/ML
30 INJECTION, SOLUTION INTRAVENOUS ONCE
Refills: 0 | Status: COMPLETED | OUTPATIENT
Start: 2025-01-26 | End: 2025-01-26

## 2025-01-26 RX ADMIN — ENOXAPARIN SODIUM 40 MILLIGRAM(S): 100 INJECTION SUBCUTANEOUS at 19:51

## 2025-01-26 RX ADMIN — DARUNAVIR, COBICISTAT, EMTRICITABINE, AND TENOFOVIR ALAFENAMIDE 1 TABLET(S): 800; 150; 200; 10 TABLET, FILM COATED ORAL at 11:12

## 2025-01-26 RX ADMIN — HYDROMORPHONE HYDROCHLORIDE 30 MILLILITER(S): 4 INJECTION, SOLUTION INTRAMUSCULAR; INTRAVENOUS; SUBCUTANEOUS at 07:34

## 2025-01-26 RX ADMIN — DOLUTEGRAVIR SODIUM 50 MILLIGRAM(S): 25 TABLET, FILM COATED ORAL at 11:12

## 2025-01-26 RX ADMIN — HYDROMORPHONE HYDROCHLORIDE 30 MILLILITER(S): 4 INJECTION, SOLUTION INTRAMUSCULAR; INTRAVENOUS; SUBCUTANEOUS at 19:33

## 2025-01-26 RX ADMIN — ASPIRIN 81 MILLIGRAM(S): 81 TABLET, COATED ORAL at 11:12

## 2025-01-26 RX ADMIN — POTASSIUM PHOSPHATE, MONOBASIC POTASSIUM PHOSPHATE, DIBASIC 83.33 MILLIMOLE(S): 236; 224 INJECTION, SOLUTION INTRAVENOUS at 11:13

## 2025-01-26 RX ADMIN — SODIUM CHLORIDE 40 MILLILITER(S): 9 INJECTION, SOLUTION INTRAVENOUS at 11:13

## 2025-01-26 NOTE — PROGRESS NOTE ADULT - ASSESSMENT
78M with history of HLD, HIV (viral load <20) initially scheduled for anterior colon resection on 07/2024, however surgery was cancelled and proceed to chemotherapy., Patient had completed 7 cycles of chemotherapy.- last session on 11/2024. MRI of the liver 7/2024 confirmed liver met. Repeat MRI showed  * Interval decrease in size of right hepatic lobe lesion compared to 7/5/2024 consistent with positive response to treatment, now barely perceptible as a 5 mm focus now s/p robotic LAR, splenic flexure mobilization, anastamosis on 1/24/25.     PLAN  - Diet: CLD   - Analgesia and antiemetics as needed; PCA   - Strict I&O's  - Monitor bowel function   - Incentive spirometry  - OOB as tolerated  - DVT prophylaxis: SCD, lovenox     Surgery D   [id number] 78M with history of HLD, HIV (viral load <20) initially scheduled for anterior colon resection on 07/2024, however surgery was cancelled and proceed to chemotherapy., Patient had completed 7 cycles of chemotherapy.- last session on 11/2024. MRI of the liver 7/2024 confirmed liver met. Repeat MRI showed  * Interval decrease in size of right hepatic lobe lesion compared to 7/5/2024 consistent with positive response to treatment, now barely perceptible as a 5 mm focus now s/p robotic LAR, splenic flexure mobilization, anastamosis on 1/24/25.     PLAN  - Diet: advance to fulls   - Analgesia and antiemetics as needed; PCA   - Strict I&O's  - Monitor bowel function   - Incentive spirometry  - OOB as tolerated  - DVT prophylaxis: SCD, lovenox     Surgery D   [id number]

## 2025-01-26 NOTE — PROGRESS NOTE ADULT - SUBJECTIVE AND OBJECTIVE BOX
SURGERY DAILY PROGRESS NOTE    Overnight Events: No acute events overnight    SUBJECTIVE: Patient seen and evaluated on AM rounds.    -----------------------------------------------------------------------------------------------------------------------------------------------------------------------------------------------------------  OBJECTIVE:  Vital Signs Last 24 Hrs  T(C): 36.5 (26 Jan 2025 04:18), Max: 36.7 (26 Jan 2025 00:31)  T(F): 97.7 (26 Jan 2025 04:18), Max: 98.1 (26 Jan 2025 00:31)  HR: 91 (26 Jan 2025 04:18) (79 - 98)  BP: 139/76 (26 Jan 2025 04:18) (125/73 - 148/71)  BP(mean): --  RR: 18 (26 Jan 2025 04:18) (17 - 18)  SpO2: 95% (26 Jan 2025 04:18) (95% - 99%)    Parameters below as of 26 Jan 2025 04:18  Patient On (Oxygen Delivery Method): room air      I&O's Detail    24 Jan 2025 07:01  -  25 Jan 2025 07:00  --------------------------------------------------------  IN:    Lactated Ringers: 440 mL    Lactated Ringers Bolus: 1500 mL    Oral Fluid: 170 mL  Total IN: 2110 mL    OUT:    Indwelling Catheter - Urethral (mL): 3160 mL  Total OUT: 3160 mL    Total NET: -1050 mL      25 Jan 2025 07:01  -  26 Jan 2025 06:45  --------------------------------------------------------  IN:    Lactated Ringers: 480 mL    Oral Fluid: 958 mL  Total IN: 1438 mL    OUT:    Indwelling Catheter - Urethral (mL): 350 mL    Voided (mL): 2020 mL  Total OUT: 2370 mL    Total NET: -932 mL        Daily     Daily   MEDICATIONS  (STANDING):  aspirin enteric coated 81 milliGRAM(s) Oral daily  darunavir 800 mG/cobicistat 150 mG/emtricitabine 200 mG/tenofovir alafenamide 10 mG (SYMTUZA) 1 Tablet(s) Oral daily  dolutegravir 50 milliGRAM(s) Oral daily  enoxaparin Injectable 40 milliGRAM(s) SubCutaneous every 24 hours  HYDROmorphone PCA (1 mG/mL) 30 milliLiter(s) PCA Continuous PCA Continuous  influenza  Vaccine (HIGH DOSE) 0.5 milliLiter(s) IntraMuscular once  lactated ringers. 1000 milliLiter(s) (40 mL/Hr) IV Continuous <Continuous>  potassium phosphate IVPB 30 milliMole(s) IV Intermittent once    MEDICATIONS  (PRN):  HYDROmorphone PCA (1 mG/mL) Rescue Clinician Bolus 0.5 milliGRAM(s) IV Push every 30 minutes PRN Severe Pain (7 - 10)  naloxone Injectable 0.1 milliGRAM(s) IV Push every 3 minutes PRN For ANY of the following changes in patient status:  A. RR LESS THAN 10 breaths per minute, B. Oxygen saturation LESS THAN 90%, C. Sedation score of 6  ondansetron Injectable 4 milliGRAM(s) IV Push every 6 hours PRN Nausea      LABS:                        12.8   9.52  )-----------( 163      ( 26 Jan 2025 05:48 )             38.6     01-26    142  |  105  |  4[L]  ----------------------------<  105[H]  3.7   |  26  |  0.64    Ca    8.8      26 Jan 2025 05:48  Phos  2.3     01-26  Mg     1.90     01-26      PT/INR - ( 24 Jan 2025 15:11 )   PT: 11.6 sec;   INR: 0.97 ratio         PTT - ( 24 Jan 2025 15:11 )  PTT:36.3 sec  Urinalysis Basic - ( 26 Jan 2025 05:48 )    Color: x / Appearance: x / SG: x / pH: x  Gluc: 105 mg/dL / Ketone: x  / Bili: x / Urobili: x   Blood: x / Protein: x / Nitrite: x   Leuk Esterase: x / RBC: x / WBC x   Sq Epi: x / Non Sq Epi: x / Bacteria: x          PHYSICAL EXAM:  Constitutional: Well developed, well nourished, NAD  Chest: Symmetric chest rise bilaterally,   Abdomen: Soft, nontender, nondistended, appropriate sx incisional tenderness with port dressings c/d/i. No rebound or guarding.   Groin: Soft, nontender, no ecchymosis/hematoma, no erythema, no edema.  Extremities: Warm to touch. No cyanosis/erythema/edema/hematoma     SURGERY DAILY PROGRESS NOTE    Overnight Events: No acute events overnight    SUBJECTIVE: Patient seen and evaluated on AM rounds. Tolerating clears, ambulating , no nausea or vomiting. -/-.     -----------------------------------------------------------------------------------------------------------------------------------------------------------------------------------------------------------  OBJECTIVE:  Vital Signs Last 24 Hrs  T(C): 36.5 (26 Jan 2025 04:18), Max: 36.7 (26 Jan 2025 00:31)  T(F): 97.7 (26 Jan 2025 04:18), Max: 98.1 (26 Jan 2025 00:31)  HR: 91 (26 Jan 2025 04:18) (79 - 98)  BP: 139/76 (26 Jan 2025 04:18) (125/73 - 148/71)  BP(mean): --  RR: 18 (26 Jan 2025 04:18) (17 - 18)  SpO2: 95% (26 Jan 2025 04:18) (95% - 99%)    Parameters below as of 26 Jan 2025 04:18  Patient On (Oxygen Delivery Method): room air      I&O's Detail    24 Jan 2025 07:01  -  25 Jan 2025 07:00  --------------------------------------------------------  IN:    Lactated Ringers: 440 mL    Lactated Ringers Bolus: 1500 mL    Oral Fluid: 170 mL  Total IN: 2110 mL    OUT:    Indwelling Catheter - Urethral (mL): 3160 mL  Total OUT: 3160 mL    Total NET: -1050 mL      25 Jan 2025 07:01  -  26 Jan 2025 06:45  --------------------------------------------------------  IN:    Lactated Ringers: 480 mL    Oral Fluid: 958 mL  Total IN: 1438 mL    OUT:    Indwelling Catheter - Urethral (mL): 350 mL    Voided (mL): 2020 mL  Total OUT: 2370 mL    Total NET: -932 mL        Daily     Daily   MEDICATIONS  (STANDING):  aspirin enteric coated 81 milliGRAM(s) Oral daily  darunavir 800 mG/cobicistat 150 mG/emtricitabine 200 mG/tenofovir alafenamide 10 mG (SYMTUZA) 1 Tablet(s) Oral daily  dolutegravir 50 milliGRAM(s) Oral daily  enoxaparin Injectable 40 milliGRAM(s) SubCutaneous every 24 hours  HYDROmorphone PCA (1 mG/mL) 30 milliLiter(s) PCA Continuous PCA Continuous  influenza  Vaccine (HIGH DOSE) 0.5 milliLiter(s) IntraMuscular once  lactated ringers. 1000 milliLiter(s) (40 mL/Hr) IV Continuous <Continuous>  potassium phosphate IVPB 30 milliMole(s) IV Intermittent once    MEDICATIONS  (PRN):  HYDROmorphone PCA (1 mG/mL) Rescue Clinician Bolus 0.5 milliGRAM(s) IV Push every 30 minutes PRN Severe Pain (7 - 10)  naloxone Injectable 0.1 milliGRAM(s) IV Push every 3 minutes PRN For ANY of the following changes in patient status:  A. RR LESS THAN 10 breaths per minute, B. Oxygen saturation LESS THAN 90%, C. Sedation score of 6  ondansetron Injectable 4 milliGRAM(s) IV Push every 6 hours PRN Nausea      LABS:                        12.8   9.52  )-----------( 163      ( 26 Jan 2025 05:48 )             38.6     01-26    142  |  105  |  4[L]  ----------------------------<  105[H]  3.7   |  26  |  0.64    Ca    8.8      26 Jan 2025 05:48  Phos  2.3     01-26  Mg     1.90     01-26      PT/INR - ( 24 Jan 2025 15:11 )   PT: 11.6 sec;   INR: 0.97 ratio         PTT - ( 24 Jan 2025 15:11 )  PTT:36.3 sec  Urinalysis Basic - ( 26 Jan 2025 05:48 )    Color: x / Appearance: x / SG: x / pH: x  Gluc: 105 mg/dL / Ketone: x  / Bili: x / Urobili: x   Blood: x / Protein: x / Nitrite: x   Leuk Esterase: x / RBC: x / WBC x   Sq Epi: x / Non Sq Epi: x / Bacteria: x          PHYSICAL EXAM:  Constitutional: Well developed, well nourished, NAD  Chest: Symmetric chest rise bilaterally,   Abdomen: Soft, nontender, nondistended, appropriate sx incisional tenderness with port dressings c/d/i. No rebound or guarding.   Groin: Soft, nontender, no ecchymosis/hematoma, no erythema, no edema.  Extremities: Warm to touch. No cyanosis/erythema/edema/hematoma

## 2025-01-26 NOTE — PROGRESS NOTE ADULT - SUBJECTIVE AND OBJECTIVE BOX
Anesthesia Pain Management Service    SUBJECTIVE: Patient is doing well with IV PCA and no significant problems reported. Ambulating, has not passed flatus yet.    Pain Scale Score	At rest: ___ 	With Activity: ___ 	[X ] Refer to charted pain scores    THERAPY:    [ ] IV PCA Morphine		[ ] 5 mg/mL	[ ] 1 mg/mL  [X ] IV PCA Hydromorphone	[ ] 5 mg/mL	[X ] 1 mg/mL  [ ] IV PCA Fentanyl		[ ] 50 micrograms/mL    Demand dose __0.2_ lockout __6_ (minutes) Continuous Rate _0__ Total: __3.6_  mg used (in past 24 hours)      MEDICATIONS  (STANDING):  aspirin enteric coated 81 milliGRAM(s) Oral daily  darunavir 800 mG/cobicistat 150 mG/emtricitabine 200 mG/tenofovir alafenamide 10 mG (SYMTUZA) 1 Tablet(s) Oral daily  dolutegravir 50 milliGRAM(s) Oral daily  enoxaparin Injectable 40 milliGRAM(s) SubCutaneous every 24 hours  HYDROmorphone PCA (1 mG/mL) 30 milliLiter(s) PCA Continuous PCA Continuous  influenza  Vaccine (HIGH DOSE) 0.5 milliLiter(s) IntraMuscular once  lactated ringers. 1000 milliLiter(s) (40 mL/Hr) IV Continuous <Continuous>  potassium phosphate IVPB 30 milliMole(s) IV Intermittent once    MEDICATIONS  (PRN):  HYDROmorphone PCA (1 mG/mL) Rescue Clinician Bolus 0.5 milliGRAM(s) IV Push every 30 minutes PRN Severe Pain (7 - 10)  naloxone Injectable 0.1 milliGRAM(s) IV Push every 3 minutes PRN For ANY of the following changes in patient status:  A. RR LESS THAN 10 breaths per minute, B. Oxygen saturation LESS THAN 90%, C. Sedation score of 6  ondansetron Injectable 4 milliGRAM(s) IV Push every 6 hours PRN Nausea      OBJECTIVE: Patient sitting in chair.    Sedation Score:	[ X] Alert	[ ] Drowsy 	[ ] Arousable	[ ] Asleep	[ ] Unresponsive    Side Effects:	[X ] None	[ ] Nausea	[ ] Vomiting	[ ] Pruritus  		[ ] Other:    Vital Signs Last 24 Hrs  T(C): 36.5 (26 Jan 2025 09:42), Max: 36.7 (26 Jan 2025 00:31)  T(F): 97.7 (26 Jan 2025 09:42), Max: 98.1 (26 Jan 2025 00:31)  HR: 75 (26 Jan 2025 09:42) (75 - 98)  BP: 159/78 (26 Jan 2025 09:42) (125/73 - 159/78)  BP(mean): --  RR: 18 (26 Jan 2025 09:42) (17 - 18)  SpO2: 96% (26 Jan 2025 09:42) (95% - 99%)    Parameters below as of 26 Jan 2025 09:42  Patient On (Oxygen Delivery Method): room air        ASSESSMENT/ PLAN    Therapy to  be:	[ X] Continue   [ ] Discontinued   [ ] Change to prn Analgesics    Documentation and Verification of current medications:   [X] Done	[ ] Not done, not elligible    Comments: Continue PCA.Recommend non-opioid adjuvant analgesics to be used when possible and when allowed by primary surgical team.    Progress Note written now but Patient was seen earlier.

## 2025-01-27 ENCOUNTER — TRANSCRIPTION ENCOUNTER (OUTPATIENT)
Age: 80
End: 2025-01-27

## 2025-01-27 LAB
ANION GAP SERPL CALC-SCNC: 8 MMOL/L — SIGNIFICANT CHANGE UP (ref 7–14)
BUN SERPL-MCNC: 2 MG/DL — LOW (ref 7–23)
CALCIUM SERPL-MCNC: 8.7 MG/DL — SIGNIFICANT CHANGE UP (ref 8.4–10.5)
CHLORIDE SERPL-SCNC: 104 MMOL/L — SIGNIFICANT CHANGE UP (ref 98–107)
CO2 SERPL-SCNC: 29 MMOL/L — SIGNIFICANT CHANGE UP (ref 22–31)
CREAT SERPL-MCNC: 0.58 MG/DL — SIGNIFICANT CHANGE UP (ref 0.5–1.3)
EGFR: 100 ML/MIN/1.73M2 — SIGNIFICANT CHANGE UP
GLUCOSE SERPL-MCNC: 105 MG/DL — HIGH (ref 70–99)
HCT VFR BLD CALC: 38 % — LOW (ref 39–50)
HGB BLD-MCNC: 12.2 G/DL — LOW (ref 13–17)
MAGNESIUM SERPL-MCNC: 1.7 MG/DL — SIGNIFICANT CHANGE UP (ref 1.6–2.6)
MCHC RBC-ENTMCNC: 31 PG — SIGNIFICANT CHANGE UP (ref 27–34)
MCHC RBC-ENTMCNC: 32.1 G/DL — SIGNIFICANT CHANGE UP (ref 32–36)
MCV RBC AUTO: 96.7 FL — SIGNIFICANT CHANGE UP (ref 80–100)
NRBC # BLD AUTO: 0 K/UL — SIGNIFICANT CHANGE UP (ref 0–0)
NRBC # BLD: 0 /100 WBCS — SIGNIFICANT CHANGE UP (ref 0–0)
NRBC # FLD: 0 K/UL — SIGNIFICANT CHANGE UP (ref 0–0)
NRBC BLD-RTO: 0 /100 WBCS — SIGNIFICANT CHANGE UP (ref 0–0)
PHOSPHATE SERPL-MCNC: 3 MG/DL — SIGNIFICANT CHANGE UP (ref 2.5–4.5)
PLATELET # BLD AUTO: 147 K/UL — LOW (ref 150–400)
POTASSIUM SERPL-MCNC: 3.7 MMOL/L — SIGNIFICANT CHANGE UP (ref 3.5–5.3)
POTASSIUM SERPL-SCNC: 3.7 MMOL/L — SIGNIFICANT CHANGE UP (ref 3.5–5.3)
RBC # BLD: 3.93 M/UL — LOW (ref 4.2–5.8)
RBC # FLD: 12.3 % — SIGNIFICANT CHANGE UP (ref 10.3–14.5)
SODIUM SERPL-SCNC: 141 MMOL/L — SIGNIFICANT CHANGE UP (ref 135–145)
WBC # BLD: 8.51 K/UL — SIGNIFICANT CHANGE UP (ref 3.8–10.5)
WBC # FLD AUTO: 8.51 K/UL — SIGNIFICANT CHANGE UP (ref 3.8–10.5)

## 2025-01-27 PROCEDURE — 99232 SBSQ HOSP IP/OBS MODERATE 35: CPT

## 2025-01-27 PROCEDURE — G0545: CPT

## 2025-01-27 RX ORDER — ACETAMINOPHEN 160 MG/5ML
1000 SUSPENSION ORAL EVERY 6 HOURS
Refills: 0 | Status: DISCONTINUED | OUTPATIENT
Start: 2025-01-27 | End: 2025-01-28

## 2025-01-27 RX ORDER — HYDROMORPHONE HYDROCHLORIDE 4 MG/ML
0.5 INJECTION, SOLUTION INTRAMUSCULAR; INTRAVENOUS; SUBCUTANEOUS EVERY 4 HOURS
Refills: 0 | Status: DISCONTINUED | OUTPATIENT
Start: 2025-01-27 | End: 2025-01-27

## 2025-01-27 RX ORDER — OXYCODONE HYDROCHLORIDE 30 MG/1
5 TABLET ORAL EVERY 4 HOURS
Refills: 0 | Status: DISCONTINUED | OUTPATIENT
Start: 2025-01-27 | End: 2025-01-28

## 2025-01-27 RX ORDER — MAGNESIUM OXIDE 400 MG
1000 TABLET ORAL
Refills: 0 | Status: DISCONTINUED | OUTPATIENT
Start: 2025-01-27 | End: 2025-01-28

## 2025-01-27 RX ORDER — POTASSIUM CHLORIDE 750 MG/1
40 TABLET, EXTENDED RELEASE ORAL ONCE
Refills: 0 | Status: COMPLETED | OUTPATIENT
Start: 2025-01-27 | End: 2025-01-27

## 2025-01-27 RX ORDER — MAGNESIUM SULFATE 0.8 MEQ/ML
2 AMPUL (ML) INJECTION ONCE
Refills: 0 | Status: COMPLETED | OUTPATIENT
Start: 2025-01-27 | End: 2025-01-27

## 2025-01-27 RX ORDER — HYDROMORPHONE HYDROCHLORIDE 4 MG/ML
0.2 INJECTION, SOLUTION INTRAMUSCULAR; INTRAVENOUS; SUBCUTANEOUS EVERY 4 HOURS
Refills: 0 | Status: DISCONTINUED | OUTPATIENT
Start: 2025-01-27 | End: 2025-01-27

## 2025-01-27 RX ORDER — ACETAMINOPHEN 160 MG/5ML
2 SUSPENSION ORAL
Qty: 0 | Refills: 0 | DISCHARGE
Start: 2025-01-27

## 2025-01-27 RX ORDER — OXYCODONE HYDROCHLORIDE 30 MG/1
2.5 TABLET ORAL EVERY 4 HOURS
Refills: 0 | Status: DISCONTINUED | OUTPATIENT
Start: 2025-01-27 | End: 2025-01-28

## 2025-01-27 RX ORDER — APIXABAN 5 MG/1
1 TABLET, FILM COATED ORAL
Qty: 56 | Refills: 0
Start: 2025-01-27 | End: 2025-02-23

## 2025-01-27 RX ORDER — ACETAMINOPHEN 160 MG/5ML
1000 SUSPENSION ORAL EVERY 6 HOURS
Refills: 0 | Status: DISCONTINUED | OUTPATIENT
Start: 2025-01-27 | End: 2025-01-27

## 2025-01-27 RX ADMIN — HYDROMORPHONE HYDROCHLORIDE 30 MILLILITER(S): 4 INJECTION, SOLUTION INTRAMUSCULAR; INTRAVENOUS; SUBCUTANEOUS at 07:35

## 2025-01-27 RX ADMIN — DARUNAVIR, COBICISTAT, EMTRICITABINE, AND TENOFOVIR ALAFENAMIDE 1 TABLET(S): 800; 150; 200; 10 TABLET, FILM COATED ORAL at 13:11

## 2025-01-27 RX ADMIN — ACETAMINOPHEN 1000 MILLIGRAM(S): 160 SUSPENSION ORAL at 13:11

## 2025-01-27 RX ADMIN — ACETAMINOPHEN 1000 MILLIGRAM(S): 160 SUSPENSION ORAL at 17:51

## 2025-01-27 RX ADMIN — ENOXAPARIN SODIUM 40 MILLIGRAM(S): 100 INJECTION SUBCUTANEOUS at 20:13

## 2025-01-27 RX ADMIN — POTASSIUM CHLORIDE 40 MILLIEQUIVALENT(S): 750 TABLET, EXTENDED RELEASE ORAL at 08:07

## 2025-01-27 RX ADMIN — ACETAMINOPHEN 400 MILLIGRAM(S): 160 SUSPENSION ORAL at 06:44

## 2025-01-27 RX ADMIN — Medication 1000 MILLIGRAM(S): at 15:36

## 2025-01-27 RX ADMIN — SODIUM CHLORIDE 40 MILLILITER(S): 9 INJECTION, SOLUTION INTRAVENOUS at 06:45

## 2025-01-27 RX ADMIN — DOLUTEGRAVIR SODIUM 50 MILLIGRAM(S): 25 TABLET, FILM COATED ORAL at 13:13

## 2025-01-27 RX ADMIN — SODIUM CHLORIDE 40 MILLILITER(S): 9 INJECTION, SOLUTION INTRAVENOUS at 08:07

## 2025-01-27 RX ADMIN — Medication 25 GRAM(S): at 08:05

## 2025-01-27 RX ADMIN — ACETAMINOPHEN 1000 MILLIGRAM(S): 160 SUSPENSION ORAL at 07:44

## 2025-01-27 RX ADMIN — ASPIRIN 81 MILLIGRAM(S): 81 TABLET, COATED ORAL at 13:11

## 2025-01-27 NOTE — DISCHARGE NOTE PROVIDER - CARE PROVIDER_API CALL
Shilo Miller, [facility]  Surgery  [address], Division [place] [address]  Phone: [phone]  Fax: [phone]  Follow Up Time: 2 weeks

## 2025-01-27 NOTE — PROGRESS NOTE ADULT - ASSESSMENT
78M with history of HLD, HIV (viral load <20) initially scheduled for anterior colon resection on 07/2024, however surgery was cancelled and proceed to chemotherapy., Patient had completed 7 cycles of chemotherapy.- last session on 11/2024. MRI of the liver 7/2024 confirmed liver met. Repeat MRI showed  * Interval decrease in size of right hepatic lobe lesion compared to 7/5/2024 consistent with positive response to treatment, now barely perceptible as a 5 mm focus now s/p robotic LAR, splenic flexure mobilization, anastamosis on 1/24/25.     PLAN  - Diet: advance to fulls   - Analgesia and antiemetics as needed; PCA   - Strict I&O's  - Monitor bowel function   - Incentive spirometry  - OOB as tolerated  - DVT prophylaxis: SCD, lovenox     Surgery D   [id number]   78M with history of HLD, HIV (viral load <20) initially scheduled for anterior colon resection on 07/2024, however surgery was cancelled and proceed to chemotherapy., Patient had completed 7 cycles of chemotherapy.- last session on 11/2024. MRI of the liver 7/2024 confirmed liver met. Repeat MRI showed  * Interval decrease in size of right hepatic lobe lesion compared to 7/5/2024 consistent with positive response to treatment, now barely perceptible as a 5 mm focus now s/p robotic LAR, splenic flexure mobilization, anastamosis on 1/24/25.     PLAN  - Diet: full liquids  - Analgesia and antiemetics as needed; PCA   - Strict I&O's  - Monitor bowel function   - Incentive spirometry  - OOB as tolerated  - DVT prophylaxis: SCD, lovenox     Surgery D   [id number]

## 2025-01-27 NOTE — DISCHARGE NOTE PROVIDER - NSDCCPTREATMENT_GEN_ALL_CORE_FT
PRINCIPAL PROCEDURE  Procedure: Robot-assisted laparoscopic low anterior resection using da Aileen Xi  Findings and Treatment:       SECONDARY PROCEDURE  Procedure: Flexible sigmoidoscopy  Findings and Treatment:

## 2025-01-27 NOTE — DISCHARGE NOTE PROVIDER - NSDCCPCAREPLAN_GEN_ALL_CORE_FT
PRINCIPAL DISCHARGE DIAGNOSIS  Diagnosis: Mass of colon  Assessment and Plan of Treatment: WOUND CARE:  Please keep incisions clean and dry. Please do not Scrub or rub incisions. Do not use lotion or powder on incisions.   BATHING: You may shower and/or sponge bathe. You may use warm soapy water in the shower and rinse, pat dry.  ACTIVITY: No heavy lifting (>20lbs) or straining for 8 weeks. Otherwise, you may return to your usual level of physical activity. If you are taking narcotic pain medication DO NOT drive a car, operate machinery or make important decisions.  PAIN: You may take over-the-counter medications for pain. You make take Tylenol orally every 6 hours as needed. Do not exceed 4,000mg a day. You may take ibuprofen every 6 hours. You may alternate between the two for better pain control (every 3 hours). You have been prescribed narcotics for pain management. Please take as prescribed on pill bottle, AS NEEDED, for BREAKTHROUGH pain ONLY. If you are taking narcotic pain medication DO NOT drive a car, operate machinery or make important decisions.  DIET: LOW FIBER  NOTIFY YOUR SURGEON IF YOU HAVE: any bleeding that does not stop, any pus draining from your wound(s), any fever (over 100.4 F) persistent nausea/vomiting, or if your pain is not controlled on your discharge pain medications, unable to urinate.  FOLLOW UP:  1. Please follow up with your primary care physician in one week regarding your hospitalization, bring copies of your discharge paperwork.  2. Please follow up with your surgeon, Dr. Lao. Call [phone] to make an appointment.     PRINCIPAL DISCHARGE DIAGNOSIS  Diagnosis: Mass of colon  Assessment and Plan of Treatment: WOUND CARE:  Please keep incisions clean and dry. Please do not Scrub or rub incisions. Do not use lotion or powder on incisions.   BATHING: You may shower and/or sponge bathe. You may use warm soapy water in the shower and rinse, pat dry.  ACTIVITY: No heavy lifting (>20lbs) or straining for 8 weeks. Otherwise, you may return to your usual level of physical activity. If you are taking narcotic pain medication DO NOT drive a car, operate machinery or make important decisions.  PAIN: You may take over-the-counter medications for pain. You make take Tylenol orally every 6 hours as needed.You have been prescribed narcotics for pain management. Please take as prescribed on pill bottle, AS NEEDED, for BREAKTHROUGH pain ONLY. If you are taking narcotic pain medication DO NOT drive a car, operate machinery or make important decisions.  DIET: LOW FIBER  NOTIFY YOUR SURGEON IF YOU HAVE: any bleeding that does not stop, any pus draining from your wound(s), any fever (over 100.4 F) persistent nausea/vomiting, or if your pain is not controlled on your discharge pain medications, unable to urinate.  FOLLOW UP:  1. Please follow up with your primary care physician in one week regarding your hospitalization, bring copies of your discharge paperwork.  2. Please follow up with your surgeon, Dr. Lao. Call [phone] to make an appointment.      SECONDARY DISCHARGE DIAGNOSES  Diagnosis: Deep vein thrombosis (DVT) prophylaxis prescribed at discharge  Assessment and Plan of Treatment: You were prescribed a low dose of the medication called Eliquis (Apixaban). This is a blood thinner used to prevent the formation of blood clot (DVT) after surgery. Continue to take this medication (1 tab, twice a day) until it is finished.   If you develop any signs of bleeding (bloody nose, vomiting blood, blood in stool or urine) STOP this medication and call to notify Dr. Lao's office. If you develop lightheadedness, dizziness, loss of consciousness in addition to signs of bleeding above, STOP this medication and seek immediate medical attention.  Limit NSAID use while on this medication as they both increase bleeding risk.

## 2025-01-27 NOTE — DISCHARGE NOTE PROVIDER - REASON FOR ADMISSION
Colon cancer COPD, mild    Hypertension, unspecified type    Multifocal pneumonia    Small cell carcinoma of lung

## 2025-01-27 NOTE — DISCHARGE NOTE PROVIDER - NSDCFUADDINST_GEN_ALL_CORE_FT
Low fiber diet: Fiber is part of fruits, vegetables, and grains not digested by your body. A low-fiber diet restricts these foods. The goal of this diet is to have fewer, smaller bowel movements.   Avoid these foods:    Nuts, seeds, dried fruit and coconut  Whole grains, popcorn, wheat germ and bran  Brown rice, wild rice, oatmeal, granola, shredded wheat, quinoa, bulgur and barley  Dried beans, baked beans, lima beans, peas and lentils  [place] peanut butter  Raw, uncooked fruits and vegetables except vegetable and fruit juices, bananas, melons, applesauce and canned peaches     Choose these foods:    Tender meat, fish and poultry, ham, pearl, shellfish, and lunch meat  Eggs, tofu and creamy peanut butter  Dairy products if tolerated  White rice and pasta  Baked goods made with refined wheat or rye flour, such as bread, biscuits, pancakes, waffles, bagels, saltines and abby crackers  Hot and cold cereals that have less than 2 grams of dietary fiber in a single serving, such as those made from rice  Canned or well-cooked potatoes, carrots and green beans  Plain tomato sauce  Vegetable and fruit juices  Bananas, melons, applesauce and canned peaches (no skin)  Butter, margarine, oils and salad dressings without seeds

## 2025-01-27 NOTE — PROGRESS NOTE ADULT - SUBJECTIVE AND OBJECTIVE BOX
TEAM [ D ] Surgery Daily Progress Note  =====================================================    SUBJECTIVE: Patient seen and examined at bedside on AM rounds. Patient reports that they're feeling well. Denies fever, chills, N/V, chest pain, SOB    ALLERGIES:  No Known Allergies    -------------------------------------------------------------------------------------    MEDICATIONS:  acetaminophen   IVPB .. 1000 milliGRAM(s) IV Intermittent every 6 hours  aspirin enteric coated 81 milliGRAM(s) Oral daily  darunavir 800 mG/cobicistat 150 mG/emtricitabine 200 mG/tenofovir alafenamide 10 mG (SYMTUZA) 1 Tablet(s) Oral daily  dolutegravir 50 milliGRAM(s) Oral daily  enoxaparin Injectable 40 milliGRAM(s) SubCutaneous every 24 hours  HYDROmorphone PCA (1 mG/mL) 30 milliLiter(s) PCA Continuous PCA Continuous  HYDROmorphone PCA (1 mG/mL) Rescue Clinician Bolus 0.5 milliGRAM(s) IV Push every 30 minutes PRN  influenza  Vaccine (HIGH DOSE) 0.5 milliLiter(s) IntraMuscular once  lactated ringers. 1000 milliLiter(s) IV Continuous <Continuous>  naloxone Injectable 0.1 milliGRAM(s) IV Push every 3 minutes PRN  ondansetron Injectable 4 milliGRAM(s) IV Push every 6 hours PRN    --------------------------------------------------------------------------------------    VITAL SIGNS:  T(C): 36.3 (01-27-25 @ 01:30), Max: 36.7 (01-26-25 @ 13:30)  HR: 84 (01-27-25 @ 01:30) (75 - 91)  BP: 144/80 (01-27-25 @ 01:30) (133/82 - 159/78)  RR: 18 (01-27-25 @ 01:30) (17 - 18)  SpO2: 97% (01-27-25 @ 01:30) (95% - 98%)  --------------------------------------------------------------------------------------    INS AND OUTS:    01-25-25 @ 07:01  -  01-26-25 @ 07:00  --------------------------------------------------------  IN: 1438 mL / OUT: 2370 mL / NET: -932 mL    01-26-25 @ 07:01  -  01-27-25 @ 05:44  --------------------------------------------------------  IN: 1400 mL / OUT: 1050 mL / NET: 350 mL      --------------------------------------------------------------------------------------      EXAM    General: NAD, resting in bed comfortably.  Cardiac: regular rate, warm and well perfused  Respiratory: Nonlabored respirations, normal cw expansion.  Abdomen: soft, nontender, nondistended  Extremities: normal strength, FROM, no deformities    --------------------------------------------------------------------------------------    LABS       TEAM [ D ] Surgery Daily Progress Note  =====================================================    SUBJECTIVE: Patient seen and examined at bedside on AM rounds. Patient reports that they're feeling well. Patient is burping and belching. He reports no flatus nor BM postop. Denies fever, chills, N/V, chest pain, SOB    ALLERGIES:  No Known Allergies    -------------------------------------------------------------------------------------    MEDICATIONS:  acetaminophen   IVPB .. 1000 milliGRAM(s) IV Intermittent every 6 hours  aspirin enteric coated 81 milliGRAM(s) Oral daily  darunavir 800 mG/cobicistat 150 mG/emtricitabine 200 mG/tenofovir alafenamide 10 mG (SYMTUZA) 1 Tablet(s) Oral daily  dolutegravir 50 milliGRAM(s) Oral daily  enoxaparin Injectable 40 milliGRAM(s) SubCutaneous every 24 hours  HYDROmorphone PCA (1 mG/mL) 30 milliLiter(s) PCA Continuous PCA Continuous  HYDROmorphone PCA (1 mG/mL) Rescue Clinician Bolus 0.5 milliGRAM(s) IV Push every 30 minutes PRN  influenza  Vaccine (HIGH DOSE) 0.5 milliLiter(s) IntraMuscular once  lactated ringers. 1000 milliLiter(s) IV Continuous <Continuous>  naloxone Injectable 0.1 milliGRAM(s) IV Push every 3 minutes PRN  ondansetron Injectable 4 milliGRAM(s) IV Push every 6 hours PRN    --------------------------------------------------------------------------------------    VITAL SIGNS:  T(C): 36.3 (01-27-25 @ 01:30), Max: 36.7 (01-26-25 @ 13:30)  HR: 84 (01-27-25 @ 01:30) (75 - 91)  BP: 144/80 (01-27-25 @ 01:30) (133/82 - 159/78)  RR: 18 (01-27-25 @ 01:30) (17 - 18)  SpO2: 97% (01-27-25 @ 01:30) (95% - 98%)  --------------------------------------------------------------------------------------    INS AND OUTS:    01-25-25 @ 07:01  -  01-26-25 @ 07:00  --------------------------------------------------------  IN: 1438 mL / OUT: 2370 mL / NET: -932 mL    01-26-25 @ 07:01  -  01-27-25 @ 05:44  --------------------------------------------------------  IN: 1400 mL / OUT: 1050 mL / NET: 350 mL      --------------------------------------------------------------------------------------      EXAM    General: NAD, resting in bed comfortably.  Cardiac: regular rate, warm and well perfused  Respiratory: Nonlabored respirations, normal cw expansion.  Abdomen: softly distended, nontender, periumbilical aquacel c/d/i, port sites c/d/i  Extremities: normal strength, FROM, no deformities    --------------------------------------------------------------------------------------    LABS                        12.2   8.51  )-----------( 147      ( 27 Jan 2025 04:46 )             38.0   01-27    141  |  104  |  2[L]  ----------------------------<  105[H]  3.7   |  29  |  0.58    Ca    8.7      27 Jan 2025 04:46  Phos  3.0     01-27  Mg     1.70     01-27

## 2025-01-27 NOTE — PROGRESS NOTE ADULT - SUBJECTIVE AND OBJECTIVE BOX
Anesthesia Pain Management Service    SUBJECTIVE: Pt now off IV PCA without problems reported.  Pain Score:  0/10    THERAPY:    [ ] IV PCA Morphine		[ ] 5 mg/mL	[ ] 1 mg/mL  [X ] IV PCA Hydromorphone	[ ] 5 mg/mL	[X ] 1 mg/mL  [ ] IV PCA Fentanyl		[ ] 50 micrograms/mL    Demand dose __0.2_ lockout __6_ (minutes) Continuous Rate _0__ Total: _4.2__  mg used (in past 24 hours)    Therapy:	  [ X] IV PCA	   [ ] Epidural           [ ] s/p Spinal Opoid              [ ] Postpartum infusion	  [ ] Patient controlled regional anesthesia (PCRA)    [ ] prn Analgesics    Allergies  No Known Allergies    MEDICATIONS  (STANDING):  acetaminophen   IVPB .. 1000 milliGRAM(s) IV Intermittent every 6 hours  aspirin enteric coated 81 milliGRAM(s) Oral daily  darunavir 800 mG/cobicistat 150 mG/emtricitabine 200 mG/tenofovir alafenamide 10 mG (SYMTUZA) 1 Tablet(s) Oral daily  dolutegravir 50 milliGRAM(s) Oral daily  enoxaparin Injectable 40 milliGRAM(s) SubCutaneous every 24 hours  influenza  Vaccine (HIGH DOSE) 0.5 milliLiter(s) IntraMuscular once    MEDICATIONS  (PRN):  HYDROmorphone  Injectable 0.2 milliGRAM(s) IV Push every 4 hours PRN Moderate Pain (4 - 6)  HYDROmorphone  Injectable 0.5 milliGRAM(s) IV Push every 4 hours PRN Severe Pain (7 - 10)  naloxone Injectable 0.1 milliGRAM(s) IV Push every 3 minutes PRN For ANY of the following changes in patient status:  A. RR LESS THAN 10 breaths per minute, B. Oxygen saturation LESS THAN 90%, C. Sedation score of 6  ondansetron Injectable 4 milliGRAM(s) IV Push every 6 hours PRN Nausea      OBJECTIVE:   [X] No new signs     [ ] Other:    Side Effects:  [X ] None			[ ] Other:    Assessment of Catheter Site:		[ ] Intact		[ ] Other:    ASSESSMENT/PLAN  [ ] Continue current therapy    [X ] Therapy changed to:    [ ] IV PCA       [ ] Epidural     [ X] prn Analgesics     Comments: IV PCA discontinued by team and they ordered PRN IV opioids and/or non-opioid adjuvant analgesics to be used at this point.    Progress Note written now but Patient was seen earlier.

## 2025-01-27 NOTE — DISCHARGE NOTE PROVIDER - NSDCMRMEDTOKEN_GEN_ALL_CORE_FT
aspirin 81 mg oral tablet: 1 tab(s) orally once a day LD 1/16/25  atorvastatin 10 mg oral tablet: 1 tab(s) orally once a day (at bedtime)  calcium and Vitamin D3 1 tab po daily - ITC:   ferrous sulfate 325 mg (65 mg elemental iron) oral tablet: 1 tab(s) orally once a week  Symtuza oral tablet: 1 tab(s) orally once a day  Tivicay 50 mg oral tablet: 1 tab(s) orally once a day  vitamin B6 100 mg po daily:   Vitamin C 500 mg oral tablet: 1 tab(s) orally once a week   acetaminophen 500 mg oral tablet: 2 tab(s) orally every 6 hours as needed for  mild pain  aspirin 81 mg oral tablet: 1 tab(s) orally once a day LD 1/16/25  atorvastatin 10 mg oral tablet: 1 tab(s) orally once a day (at bedtime)  calcium and Vitamin D3 1 tab po daily - ITC:   Eliquis 2.5 mg oral tablet: 1 tab(s) orally 2 times a day  ferrous sulfate 325 mg (65 mg elemental iron) oral tablet: 1 tab(s) orally once a week  Symtuza oral tablet: 1 tab(s) orally once a day  Tivicay 50 mg oral tablet: 1 tab(s) orally once a day  vitamin B6 100 mg po daily:   Vitamin C 500 mg oral tablet: 1 tab(s) orally once a week   acetaminophen 500 mg oral tablet: 2 tab(s) orally every 6 hours as needed for  mild pain  aspirin 81 mg oral tablet: 1 tab(s) orally once a day LD 1/16/25  atorvastatin 10 mg oral tablet: 1 tab(s) orally once a day (at bedtime)  calcium and Vitamin D3 1 tab po daily - ITC:   Eliquis 2.5 mg oral tablet: 1 tab(s) orally 2 times a day  ferrous sulfate 325 mg (65 mg elemental iron) oral tablet: 1 tab(s) orally once a week  oxyCODONE 5 mg oral tablet: 1 tab(s) orally every 6 hours MDD: 4 tablets  Symtuza oral tablet: 1 tab(s) orally once a day  Tivicay 50 mg oral tablet: 1 tab(s) orally once a day  vitamin B6 100 mg po daily:   Vitamin C 500 mg oral tablet: 1 tab(s) orally once a week

## 2025-01-27 NOTE — PROGRESS NOTE ADULT - SUBJECTIVE AND OBJECTIVE BOX
Infectious Diseases Follow Up:    Patient is a 79y old  Male who presents with a chief complaint of Colon cancer (27 Jan 2025 10:31)      Interval History/ROS:    Allergies  No Known Allergies        ANTIMICROBIALS:  darunavir 800 mG/cobicistat 150 mG/emtricitabine 200 mG/tenofovir alafenamide 10 mG (SYMTUZA) 1 daily  dolutegravir 50 daily      Current Abx:     Previous Abx     OTHER MEDS:  MEDICATIONS  (STANDING):  acetaminophen     Tablet .. 1000 every 6 hours  aspirin enteric coated 81 daily  enoxaparin Injectable 40 every 24 hours  influenza  Vaccine (HIGH DOSE) 0.5 once  ondansetron Injectable 4 every 6 hours PRN  oxyCODONE    IR 5 every 4 hours PRN  oxyCODONE    IR 2.5 every 4 hours PRN      Vital Signs Last 24 Hrs  T(C): 36.7 (27 Jan 2025 09:28), Max: 36.7 (26 Jan 2025 13:30)  T(F): 98.1 (27 Jan 2025 09:28), Max: 98.1 (27 Jan 2025 09:28)  HR: 81 (27 Jan 2025 09:28) (81 - 91)  BP: 121/69 (27 Jan 2025 09:28) (121/69 - 155/65)  BP(mean): --  RR: 17 (27 Jan 2025 09:28) (17 - 18)  SpO2: 98% (27 Jan 2025 09:28) (95% - 98%)    Parameters below as of 27 Jan 2025 09:28  Patient On (Oxygen Delivery Method): room air        PHYSICAL EXAM:  GENERAL: NAD, well-developed  HEAD:  Atraumatic, Normocephalic  EYES: EOMI, PERRLA, conjunctiva and sclera clear  NECK: Supple, No JVD  CHEST/LUNG: Clear to auscultation bilaterally; No wheeze  HEART: Regular rate and rhythm; No murmurs, rubs, or gallops  ABDOMEN: Soft, Nontender, Nondistended; Bowel sounds present  EXTREMITIES:  2+ Peripheral Pulses, No clubbing, cyanosis, or edema  PSYCH: AAOx3  NEUROLOGY: non-focal  SKIN: No rashes or lesions                          12.2   8.51  )-----------( 147      ( 27 Jan 2025 04:46 )             38.0       01-27    141  |  104  |  2[L]  ----------------------------<  105[H]  3.7   |  29  |  0.58    Ca    8.7      27 Jan 2025 04:46  Phos  3.0     01-27  Mg     1.70     01-27        Urinalysis Basic - ( 27 Jan 2025 04:46 )    Color: x / Appearance: x / SG: x / pH: x  Gluc: 105 mg/dL / Ketone: x  / Bili: x / Urobili: x   Blood: x / Protein: x / Nitrite: x   Leuk Esterase: x / RBC: x / WBC x   Sq Epi: x / Non Sq Epi: x / Bacteria: x        MICROBIOLOGY:  v    HIV-1 RNA Quantitative, Viral Load Log: NOT DET. lg /mL (10-25-24 @ 07:45)          RADIOLOGY:       Infectious Diseases Follow Up:    Patient is a 79y old  Male who presents with a chief complaint of Colon cancer (27 Jan 2025 10:31)      Interval History/ROS:  No acute complaints, feeling well.     Allergies  No Known Allergies        ANTIMICROBIALS:  darunavir 800 mG/cobicistat 150 mG/emtricitabine 200 mG/tenofovir alafenamide 10 mG (SYMTUZA) 1 daily  dolutegravir 50 daily      Current Abx:     Previous Abx     OTHER MEDS:  MEDICATIONS  (STANDING):  acetaminophen     Tablet .. 1000 every 6 hours  aspirin enteric coated 81 daily  enoxaparin Injectable 40 every 24 hours  influenza  Vaccine (HIGH DOSE) 0.5 once  ondansetron Injectable 4 every 6 hours PRN  oxyCODONE    IR 5 every 4 hours PRN  oxyCODONE    IR 2.5 every 4 hours PRN      Vital Signs Last 24 Hrs  T(C): 36.7 (27 Jan 2025 09:28), Max: 36.7 (26 Jan 2025 13:30)  T(F): 98.1 (27 Jan 2025 09:28), Max: 98.1 (27 Jan 2025 09:28)  HR: 81 (27 Jan 2025 09:28) (81 - 91)  BP: 121/69 (27 Jan 2025 09:28) (121/69 - 155/65)  BP(mean): --  RR: 17 (27 Jan 2025 09:28) (17 - 18)  SpO2: 98% (27 Jan 2025 09:28) (95% - 98%)    Parameters below as of 27 Jan 2025 09:28  Patient On (Oxygen Delivery Method): room air        PHYSICAL EXAM:  GENERAL: NAD, well-developed  HEAD:  Atraumatic, Normocephalic  EYES: EOM, conjunctiva and sclera clear  CHEST/LUNG: On RA, not in respiratory distress  PSYCH: AAOx3                            12.2   8.51  )-----------( 147      ( 27 Jan 2025 04:46 )             38.0       01-27    141  |  104  |  2[L]  ----------------------------<  105[H]  3.7   |  29  |  0.58    Ca    8.7      27 Jan 2025 04:46  Phos  3.0     01-27  Mg     1.70     01-27        Urinalysis Basic - ( 27 Jan 2025 04:46 )    Color: x / Appearance: x / SG: x / pH: x  Gluc: 105 mg/dL / Ketone: x  / Bili: x / Urobili: x   Blood: x / Protein: x / Nitrite: x   Leuk Esterase: x / RBC: x / WBC x   Sq Epi: x / Non Sq Epi: x / Bacteria: x        MICROBIOLOGY:  v    HIV-1 RNA Quantitative, Viral Load Log: NOT DET. lg /mL (10-25-24 @ 07:45)          RADIOLOGY:

## 2025-01-27 NOTE — DISCHARGE NOTE PROVIDER - HOSPITAL COURSE
This patient is a 79M with  HLD, HIV (viral load <20), and colon cancer with liver lesion s/p chemotherapy who presented to [facility] on 1/24/25 for scheduled surgery. Patient taken to the OR by Dr. Lao and underwent robot-assisted laparoscopic LAR. Patient tolerated operation well and there were no post-operative complications identified. Patient remained hemodynamically stable in the PACU and transferred to the surgical floor. Infectious disease consulted for management of HIV regimen. Diet was restarted and advanced as tolerated. Pain control was transitioned from IV to PO pain meds. At this time, patient is currently ambulating, voiding, tolerating a regular diet. Pain well controlled on PO pain meds. Patient has been deemed stable for discharge home with follow up as an outpatient.

## 2025-01-27 NOTE — PROGRESS NOTE ADULT - ASSESSMENT
This is a 77 y/o M w/ PMHx of HLD, HIV (diagnosed with 2016, well controlled on Symtuza, Tivicay) admitted to [facility] on 1/24/25 for elective robotic sigmoid colon resection, now s/p surgery on 1/24.   Pt s/p 7 cycles of chemotherapy, last 11/24, noted to have liver met on MRI, now noted to be improved.     #HIV, well controlled   #Colon CA w/ met to liver, s/p chemo, robotic assocated laparoscopic LAR  #Leukocytosis, possibly reactive in the setting of surgery    Pt well history of well controlled HIV, diagnosed in 2016, follows w/ Dr. Juan Alberto Causey, currently on Symtuza and Tivicay, doesn't miss any doses, however missed about 7 days prior to surgery. Per patient's wife, last VL was checked last month, was UD.     Recommendations:  1. C/w Symtuza, Tivicay for HIV   2. No need to check VL or CD4 while admitted  3. Outpatient follow up Dr. Avendano     Thank you for this consult. Inpatient ID consult team will sign off.    Further changes in lab values, imaging studies, or clinical status will not be known to ID inpatient consultants unless specifically communicated by primary team.    Adarsh Matos MD  Attending Physician  Division of Infectious Diseases  Department of Medicine    Please contact through MS Teams message.  Office: [phone] (after 5 PM or weekend)

## 2025-01-28 ENCOUNTER — TRANSCRIPTION ENCOUNTER (OUTPATIENT)
Age: 80
End: 2025-01-28

## 2025-01-28 VITALS
SYSTOLIC BLOOD PRESSURE: 142 MMHG | RESPIRATION RATE: 16 BRPM | OXYGEN SATURATION: 100 % | TEMPERATURE: 98 F | DIASTOLIC BLOOD PRESSURE: 73 MMHG | HEART RATE: 97 BPM

## 2025-01-28 PROBLEM — K76.9 LIVER DISEASE, UNSPECIFIED: Chronic | Status: ACTIVE | Noted: 2025-01-14

## 2025-01-28 PROBLEM — Z86.2 PERSONAL HISTORY OF DISEASES OF THE BLOOD AND BLOOD-FORMING ORGANS AND CERTAIN DISORDERS INVOLVING THE IMMUNE MECHANISM: Chronic | Status: ACTIVE | Noted: 2025-01-14

## 2025-01-28 PROBLEM — D69.6 THROMBOCYTOPENIA, UNSPECIFIED: Chronic | Status: ACTIVE | Noted: 2025-01-14

## 2025-01-28 PROBLEM — J18.9 PNEUMONIA, UNSPECIFIED ORGANISM: Chronic | Status: ACTIVE | Noted: 2025-01-14

## 2025-01-28 PROBLEM — Z86.19 PERSONAL HISTORY OF OTHER INFECTIOUS AND PARASITIC DISEASES: Chronic | Status: ACTIVE | Noted: 2025-01-14

## 2025-01-28 LAB
ANION GAP SERPL CALC-SCNC: 10 MMOL/L — SIGNIFICANT CHANGE UP (ref 7–14)
BUN SERPL-MCNC: 6 MG/DL — LOW (ref 7–23)
CALCIUM SERPL-MCNC: 8.9 MG/DL — SIGNIFICANT CHANGE UP (ref 8.4–10.5)
CHLORIDE SERPL-SCNC: 103 MMOL/L — SIGNIFICANT CHANGE UP (ref 98–107)
CO2 SERPL-SCNC: 26 MMOL/L — SIGNIFICANT CHANGE UP (ref 22–31)
CREAT SERPL-MCNC: 0.66 MG/DL — SIGNIFICANT CHANGE UP (ref 0.5–1.3)
EGFR: 97 ML/MIN/1.73M2 — SIGNIFICANT CHANGE UP
GLUCOSE SERPL-MCNC: 103 MG/DL — HIGH (ref 70–99)
HCT VFR BLD CALC: 38.5 % — LOW (ref 39–50)
HGB BLD-MCNC: 12.4 G/DL — LOW (ref 13–17)
MAGNESIUM SERPL-MCNC: 2.1 MG/DL — SIGNIFICANT CHANGE UP (ref 1.6–2.6)
MCHC RBC-ENTMCNC: 31.2 PG — SIGNIFICANT CHANGE UP (ref 27–34)
MCHC RBC-ENTMCNC: 32.2 G/DL — SIGNIFICANT CHANGE UP (ref 32–36)
MCV RBC AUTO: 97 FL — SIGNIFICANT CHANGE UP (ref 80–100)
NRBC # BLD AUTO: 0 K/UL — SIGNIFICANT CHANGE UP (ref 0–0)
NRBC # BLD: 0 /100 WBCS — SIGNIFICANT CHANGE UP (ref 0–0)
NRBC # FLD: 0 K/UL — SIGNIFICANT CHANGE UP (ref 0–0)
NRBC BLD-RTO: 0 /100 WBCS — SIGNIFICANT CHANGE UP (ref 0–0)
PHOSPHATE SERPL-MCNC: 3 MG/DL — SIGNIFICANT CHANGE UP (ref 2.5–4.5)
PLATELET # BLD AUTO: 145 K/UL — LOW (ref 150–400)
POTASSIUM SERPL-MCNC: 4.5 MMOL/L — SIGNIFICANT CHANGE UP (ref 3.5–5.3)
POTASSIUM SERPL-SCNC: 4.5 MMOL/L — SIGNIFICANT CHANGE UP (ref 3.5–5.3)
RBC # BLD: 3.97 M/UL — LOW (ref 4.2–5.8)
RBC # FLD: 12.4 % — SIGNIFICANT CHANGE UP (ref 10.3–14.5)
SODIUM SERPL-SCNC: 139 MMOL/L — SIGNIFICANT CHANGE UP (ref 135–145)
WBC # BLD: 8.48 K/UL — SIGNIFICANT CHANGE UP (ref 3.8–10.5)
WBC # FLD AUTO: 8.48 K/UL — SIGNIFICANT CHANGE UP (ref 3.8–10.5)

## 2025-01-28 RX ORDER — OXYCODONE HYDROCHLORIDE 30 MG/1
1 TABLET ORAL
Qty: 12 | Refills: 0
Start: 2025-01-28 | End: 2025-01-30

## 2025-01-28 RX ADMIN — ACETAMINOPHEN 1000 MILLIGRAM(S): 160 SUSPENSION ORAL at 01:27

## 2025-01-28 RX ADMIN — ACETAMINOPHEN 1000 MILLIGRAM(S): 160 SUSPENSION ORAL at 02:27

## 2025-01-28 RX ADMIN — ACETAMINOPHEN 1000 MILLIGRAM(S): 160 SUSPENSION ORAL at 10:42

## 2025-01-28 NOTE — DISCHARGE NOTE NURSING/CASE MANAGEMENT/SOCIAL WORK - FINANCIAL ASSISTANCE
[facility] provides services at a reduced cost to those who are determined to be eligible through [facility]’s financial assistance program. Information regarding [facility]’s financial assistance program can be found by going to [email or web address] or by calling [phone].

## 2025-01-28 NOTE — PROGRESS NOTE ADULT - SUBJECTIVE AND OBJECTIVE BOX
TEAM [ D ] Surgery Daily Progress Note  =====================================================    SUBJECTIVE: Patient seen and examined at bedside on AM rounds. Patient reports that they're feeling well. Denies fever, chills, N/V, chest pain, SOB    ALLERGIES:  No Known Allergies    -------------------------------------------------------------------------------------    MEDICATIONS:  acetaminophen     Tablet .. 1000 milliGRAM(s) Oral every 6 hours  aspirin enteric coated 81 milliGRAM(s) Oral daily  darunavir 800 mG/cobicistat 150 mG/emtricitabine 200 mG/tenofovir alafenamide 10 mG (SYMTUZA) 1 Tablet(s) Oral daily  dolutegravir 50 milliGRAM(s) Oral daily  enoxaparin Injectable 40 milliGRAM(s) SubCutaneous every 24 hours  influenza  Vaccine (HIGH DOSE) 0.5 milliLiter(s) IntraMuscular once  magnesium oxide 1000 milliGRAM(s) Oral two times a day with meals  naloxone Injectable 0.1 milliGRAM(s) IV Push every 3 minutes PRN  ondansetron Injectable 4 milliGRAM(s) IV Push every 6 hours PRN  oxyCODONE    IR 5 milliGRAM(s) Oral every 4 hours PRN  oxyCODONE    IR 2.5 milliGRAM(s) Oral every 4 hours PRN    --------------------------------------------------------------------------------------    VITAL SIGNS:  T(C): 36.6 (01-28-25 @ 04:52), Max: 37.1 (01-27-25 @ 17:45)  HR: 77 (01-28-25 @ 04:52) (77 - 90)  BP: 140/75 (01-28-25 @ 04:52) (118/70 - 143/74)  RR: 18 (01-28-25 @ 04:52) (17 - 18)  SpO2: 98% (01-28-25 @ 04:52) (98% - 99%)  --------------------------------------------------------------------------------------    INS AND OUTS:    01-26-25 @ 07:01  -  01-27-25 @ 07:00  --------------------------------------------------------  IN: 1400 mL / OUT: 1450 mL / NET: -50 mL    01-27-25 @ 07:01  -  01-28-25 @ 05:44  --------------------------------------------------------  IN: 1190 mL / OUT: 775 mL / NET: 415 mL      --------------------------------------------------------------------------------------      EXAM    General: NAD, resting in bed comfortably.  Cardiac: regular rate, warm and well perfused  Respiratory: Nonlabored respirations, normal cw expansion.  Abdomen: soft, nontender, nondistended  Extremities: normal strength, FROM, no deformities    --------------------------------------------------------------------------------------    LABS                        12.4   8.48  )-----------( 145      ( 28 Jan 2025 03:56 )             38.5   01-28    139  |  103  |  6[L]  ----------------------------<  103[H]  4.5   |  26  |  0.66    Ca    8.9      28 Jan 2025 03:56  Phos  3.0     01-28  Mg     2.10     01-28         TEAM [ D ] Surgery Daily Progress Note  =====================================================    SUBJECTIVE: Patient seen and examined at bedside on AM rounds. Patient reports that they're feeling well. He is passing gas and having bowel movements. He is tolerating a regular diet. Denies fever, chills, N/V, chest pain, SOB    ALLERGIES:  No Known Allergies    -------------------------------------------------------------------------------------    MEDICATIONS:  acetaminophen     Tablet .. 1000 milliGRAM(s) Oral every 6 hours  aspirin enteric coated 81 milliGRAM(s) Oral daily  darunavir 800 mG/cobicistat 150 mG/emtricitabine 200 mG/tenofovir alafenamide 10 mG (SYMTUZA) 1 Tablet(s) Oral daily  dolutegravir 50 milliGRAM(s) Oral daily  enoxaparin Injectable 40 milliGRAM(s) SubCutaneous every 24 hours  influenza  Vaccine (HIGH DOSE) 0.5 milliLiter(s) IntraMuscular once  magnesium oxide 1000 milliGRAM(s) Oral two times a day with meals  naloxone Injectable 0.1 milliGRAM(s) IV Push every 3 minutes PRN  ondansetron Injectable 4 milliGRAM(s) IV Push every 6 hours PRN  oxyCODONE    IR 5 milliGRAM(s) Oral every 4 hours PRN  oxyCODONE    IR 2.5 milliGRAM(s) Oral every 4 hours PRN    --------------------------------------------------------------------------------------    VITAL SIGNS:  T(C): 36.6 (01-28-25 @ 04:52), Max: 37.1 (01-27-25 @ 17:45)  HR: 77 (01-28-25 @ 04:52) (77 - 90)  BP: 140/75 (01-28-25 @ 04:52) (118/70 - 143/74)  RR: 18 (01-28-25 @ 04:52) (17 - 18)  SpO2: 98% (01-28-25 @ 04:52) (98% - 99%)  --------------------------------------------------------------------------------------    INS AND OUTS:    01-26-25 @ 07:01  -  01-27-25 @ 07:00  --------------------------------------------------------  IN: 1400 mL / OUT: 1450 mL / NET: -50 mL    01-27-25 @ 07:01  -  01-28-25 @ 05:44  --------------------------------------------------------  IN: 1190 mL / OUT: 775 mL / NET: 415 mL      --------------------------------------------------------------------------------------      EXAM    General: NAD, resting in bed comfortably.  Cardiac: regular rate, warm and well perfused  Respiratory: Nonlabored respirations, normal cw expansion.  Abdomen: soft, nontender, nondistended, port sites c/d/i  Extremities: normal strength, FROM, no deformities    --------------------------------------------------------------------------------------    LABS                        12.4   8.48  )-----------( 145      ( 28 Jan 2025 03:56 )             38.5   01-28    139  |  103  |  6[L]  ----------------------------<  103[H]  4.5   |  26  |  0.66    Ca    8.9      28 Jan 2025 03:56  Phos  3.0     01-28  Mg     2.10     01-28

## 2025-01-28 NOTE — DISCHARGE NOTE NURSING/CASE MANAGEMENT/SOCIAL WORK - NSDCPNINST_GEN_ALL_CORE
Call MD with any problems or questions. Notify MD for fever, inability to tolerate food or liquids or increased drainage from incisions.

## 2025-01-28 NOTE — PROGRESS NOTE ADULT - ASSESSMENT
78M with history of HLD, HIV (viral load <20) initially scheduled for anterior colon resection on 07/2024, however surgery was cancelled and proceed to chemotherapy., Patient had completed 7 cycles of chemotherapy.- last session on 11/2024. MRI of the liver 7/2024 confirmed liver met. Repeat MRI showed  * Interval decrease in size of right hepatic lobe lesion compared to 7/5/2024 consistent with positive response to treatment, now barely perceptible as a 5 mm focus now s/p robotic LAR, splenic flexure mobilization, anastamosis on 1/24/25.     PLAN  - Diet: regular  - Analgesia and antiemetics as needed; tylenol and oxy PRN  - Strict I&O's  - Monitor bowel function   - Incentive spirometry  - OOB as tolerated  - DVT prophylaxis: SCD, lovenox   - Possible dc home today no needs    Surgery D   [id number]

## 2025-01-28 NOTE — DISCHARGE NOTE NURSING/CASE MANAGEMENT/SOCIAL WORK - NSDCPEFALRISK_GEN_ALL_CORE
For information on Fall & Injury Prevention, visit: [email or web address] OR  [email or web address] OR  https://www.cdc.gov/steadi/patient.html

## 2025-01-28 NOTE — DISCHARGE NOTE NURSING/CASE MANAGEMENT/SOCIAL WORK - PATIENT PORTAL LINK FT
You can access the FollowMyHealth Patient Portal offered by [facility] by registering at the following website: [email or web address]. By joining [facility]’s FollowMyHealth portal, you will also be able to view your health information using other applications (apps) compatible with our system.

## 2025-02-05 ENCOUNTER — NON-APPOINTMENT (OUTPATIENT)
Age: 80
End: 2025-02-05

## 2025-02-05 ENCOUNTER — APPOINTMENT (OUTPATIENT)
Dept: SURGICAL ONCOLOGY | Facility: CLINIC | Age: 80
End: 2025-02-05
Payer: MEDICARE

## 2025-02-05 VITALS
HEART RATE: 78 BPM | TEMPERATURE: 97.9 F | WEIGHT: 159 LBS | SYSTOLIC BLOOD PRESSURE: 133 MMHG | OXYGEN SATURATION: 98 % | BODY MASS INDEX: 25.55 KG/M2 | DIASTOLIC BLOOD PRESSURE: 81 MMHG | HEIGHT: 66 IN

## 2025-02-05 PROBLEM — Z92.21 PERSONAL HISTORY OF ANTINEOPLASTIC CHEMOTHERAPY: Chronic | Status: ACTIVE | Noted: 2025-01-14

## 2025-02-05 PROCEDURE — 99024 POSTOP FOLLOW-UP VISIT: CPT

## 2025-02-07 LAB — SURGICAL PATHOLOGY STUDY: SIGNIFICANT CHANGE UP

## 2025-02-10 ENCOUNTER — NON-APPOINTMENT (OUTPATIENT)
Age: 80
End: 2025-02-10

## 2025-02-12 ENCOUNTER — APPOINTMENT (OUTPATIENT)
Dept: SURGICAL ONCOLOGY | Facility: CLINIC | Age: 80
End: 2025-02-12
Payer: MEDICARE

## 2025-02-12 VITALS
DIASTOLIC BLOOD PRESSURE: 81 MMHG | BODY MASS INDEX: 25.88 KG/M2 | HEIGHT: 66 IN | HEART RATE: 73 BPM | WEIGHT: 161 LBS | SYSTOLIC BLOOD PRESSURE: 142 MMHG | TEMPERATURE: 98.1 F | OXYGEN SATURATION: 98 %

## 2025-02-12 PROCEDURE — 99024 POSTOP FOLLOW-UP VISIT: CPT

## 2025-02-19 ENCOUNTER — NON-APPOINTMENT (OUTPATIENT)
Age: 80
End: 2025-02-19

## 2025-02-19 ENCOUNTER — APPOINTMENT (OUTPATIENT)
Dept: SURGICAL ONCOLOGY | Facility: CLINIC | Age: 80
End: 2025-02-19

## 2025-05-13 ENCOUNTER — RESULT REVIEW (OUTPATIENT)
Age: 80
End: 2025-05-13

## 2025-05-16 ENCOUNTER — APPOINTMENT (OUTPATIENT)
Dept: CT IMAGING | Facility: CLINIC | Age: 80
End: 2025-05-16

## 2025-05-16 PROCEDURE — 74177 CT ABD & PELVIS W/CONTRAST: CPT

## 2025-05-20 ENCOUNTER — APPOINTMENT (OUTPATIENT)
Dept: SURGICAL ONCOLOGY | Facility: CLINIC | Age: 80
End: 2025-05-20
Payer: MEDICARE

## 2025-05-20 VITALS
HEART RATE: 83 BPM | WEIGHT: 169 LBS | SYSTOLIC BLOOD PRESSURE: 143 MMHG | HEIGHT: 66 IN | BODY MASS INDEX: 27.16 KG/M2 | OXYGEN SATURATION: 97 % | DIASTOLIC BLOOD PRESSURE: 78 MMHG

## 2025-05-20 PROCEDURE — 99214 OFFICE O/P EST MOD 30 MIN: CPT

## 2025-06-13 ENCOUNTER — TRANSCRIPTION ENCOUNTER (OUTPATIENT)
Age: 80
End: 2025-06-13

## 2025-06-13 ENCOUNTER — APPOINTMENT (OUTPATIENT)
Dept: INTERVENTIONAL RADIOLOGY/VASCULAR | Facility: HOSPITAL | Age: 80
End: 2025-06-13

## 2025-06-13 ENCOUNTER — OUTPATIENT (OUTPATIENT)
Dept: OUTPATIENT SERVICES | Facility: HOSPITAL | Age: 80
LOS: 1 days | End: 2025-06-13
Payer: MEDICARE

## 2025-06-13 VITALS — HEART RATE: 67 BPM | TEMPERATURE: 98 F | RESPIRATION RATE: 16 BRPM | OXYGEN SATURATION: 100 %

## 2025-06-13 VITALS
HEART RATE: 81 BPM | OXYGEN SATURATION: 97 % | DIASTOLIC BLOOD PRESSURE: 76 MMHG | SYSTOLIC BLOOD PRESSURE: 147 MMHG | RESPIRATION RATE: 21 BRPM | TEMPERATURE: 98 F

## 2025-06-13 DIAGNOSIS — R60.9 EDEMA, UNSPECIFIED: ICD-10-CM

## 2025-06-13 DIAGNOSIS — Z98.890 OTHER SPECIFIED POSTPROCEDURAL STATES: Chronic | ICD-10-CM

## 2025-06-13 DIAGNOSIS — Z90.89 ACQUIRED ABSENCE OF OTHER ORGANS: Chronic | ICD-10-CM

## 2025-06-13 PROCEDURE — 36590 REMOVAL TUNNELED CV CATH: CPT

## 2025-06-13 PROCEDURE — 76000 FLUOROSCOPY <1 HR PHYS/QHP: CPT | Mod: 26,59

## 2025-06-13 PROCEDURE — 76000 FLUOROSCOPY <1 HR PHYS/QHP: CPT

## 2025-06-13 RX ORDER — ONDANSETRON HCL/PF 4 MG/2 ML
4 VIAL (ML) INJECTION ONCE
Refills: 0 | Status: DISCONTINUED | OUTPATIENT
Start: 2025-06-13 | End: 2025-06-13

## 2025-06-13 RX ORDER — FENTANYL CITRATE-0.9 % NACL/PF 100MCG/2ML
25 SYRINGE (ML) INTRAVENOUS
Refills: 0 | Status: DISCONTINUED | OUTPATIENT
Start: 2025-06-13 | End: 2025-06-13

## 2025-06-13 RX ORDER — ACETAMINOPHEN 500 MG/5ML
1000 LIQUID (ML) ORAL EVERY 6 HOURS
Refills: 0 | Status: DISCONTINUED | OUTPATIENT
Start: 2025-06-13 | End: 2025-06-13

## 2025-06-13 NOTE — ASU DISCHARGE PLAN (ADULT/PEDIATRIC) - FINANCIAL ASSISTANCE
[facility] provides services at a reduced cost to those who are determined to be eligible through [facility]’s financial assistance program. Information regarding [facility]’s financial assistance program can be found by going to [email or web address] or by calling [phone].

## 2025-06-13 NOTE — ASU DISCHARGE PLAN (ADULT/PEDIATRIC) - PROVIDER TOKENS
PROVIDER:[TOKEN:[[id number]],ESTABLISHEDPATIENT:[T]] PROVIDER:[TOKEN:[[id number]],FOLLOWUP:[2 weeks],ESTABLISHEDPATIENT:[T]]

## 2025-06-13 NOTE — ASU DISCHARGE PLAN (ADULT/PEDIATRIC) - CARE PROVIDER_API CALL
Sulema Cadet  Medical Oncology  [address], [address]  [place] [address]  Phone: [phone]  Fax: [phone]  Established Patient  Follow Up Time:    Sulema Cadet  Medical Oncology  [address], [address]  [place] [address]  Phone: [phone]  Fax: [phone]  Established Patient  Follow Up Time: 2 weeks

## (undated) DEVICE — TUBING SMARTCAP ENDO OLYMPUS 140/160/180/190 2"

## (undated) DEVICE — GOWN XXXL

## (undated) DEVICE — DRSG AQUACEL 3.5 X 4"

## (undated) DEVICE — XI ARM FORCEP PROGRASP 8MM

## (undated) DEVICE — BASIN SET SINGLE

## (undated) DEVICE — ENDOCATCH 10MM

## (undated) DEVICE — GOWN XL

## (undated) DEVICE — POSITIONER FOAM EGG CRATE ULNAR 2PCS (PINK)

## (undated) DEVICE — STAPLER COVIDIEN ENDO GIA STANDARD HANDLE

## (undated) DEVICE — Device

## (undated) DEVICE — XI DRAPE ARM

## (undated) DEVICE — DRAPE IOBAN 23" X 23"

## (undated) DEVICE — SUT MONOCRYL 4-0 27" PS-2 UNDYED

## (undated) DEVICE — BIOSEL SIGMOIDOSCOPE KIT DISP

## (undated) DEVICE — TUBING SMARTCAP WITH CO2 ENDO 140/160/180/190 16"

## (undated) DEVICE — DRAPE WARMING SOLUTION 44 X 44"

## (undated) DEVICE — ELCTR BOVIE PENCIL SMOKE EVACUATION

## (undated) DEVICE — POSITIONER STRAP ARMBOARD VELCRO TS-30

## (undated) DEVICE — FOLEY TRAY 16FR 5CC LF UMETER CLOSED

## (undated) DEVICE — BLADE SCALPEL SAFETYLOCK #10

## (undated) DEVICE — XI ARM SCISSOR MONO CURVED

## (undated) DEVICE — VENODYNE/SCD SLEEVE CALF MEDIUM

## (undated) DEVICE — BLADE SCALPEL SAFETYLOCK #15

## (undated) DEVICE — PACK ROBOTIC LIJ

## (undated) DEVICE — ELCTR BOVIE TIP BLADE INSULATED 6.5" EDGE

## (undated) DEVICE — XI ARM CLIP APPLIER LARGE

## (undated) DEVICE — XI ARM NEEDLE DRIVER SUTURECUT MEGA 8MM

## (undated) DEVICE — ELCTR GROUNDING PAD ADULT COVIDIEN

## (undated) DEVICE — PREP BETADINE KIT

## (undated) DEVICE — XI CORD MONOPOLAR CAUTERY (GREEN)

## (undated) DEVICE — SUT SILK 2-0 30" SH

## (undated) DEVICE — XI CORD BIPOLAR CAUTERY (BLUE)

## (undated) DEVICE — POSITIONER FOAM HEAD CRADLE (PINK)

## (undated) DEVICE — POSITIONER PURPLE ARM ONE STEP (LARGE)

## (undated) DEVICE — SCOPE WARMER SEAL DISP

## (undated) DEVICE — TROCAR APPLIED MEDICAL KII BALLOON BLUNT TIP 12MM X 130MM

## (undated) DEVICE — SUT VICRYL 0 27" UR-6

## (undated) DEVICE — WARMING BLANKET FULL ADULT

## (undated) DEVICE — XI ARM FORCEP FENESTRATED BIPOLAR 8MM

## (undated) DEVICE — DRSG CURITY GAUZE SPONGE 4 X 4" 12-PLY

## (undated) DEVICE — TUBING STRYKEFLOW II SUCTION / IRRIGATOR

## (undated) DEVICE — ELCTR BOVIE TIP BLADE INSULATED 2.75" EDGE WITH SAFETY

## (undated) DEVICE — XI ARM GRASPER TIP UP FENESTRATED

## (undated) DEVICE — SUT SILK 3-0 30" SH

## (undated) DEVICE — XI SCISSOR TIP COVER

## (undated) DEVICE — TUBING AIRSEAL TRI-LUMEN FILTERED

## (undated) DEVICE — XI OBTURATOR OPTICAL BLADELESS 8MM

## (undated) DEVICE — XI VESSEL SEALER

## (undated) DEVICE — ELECTRO LUBE ANTI-STICK SOLUTION FOR CAUTERY TIP

## (undated) DEVICE — XI SEAL UNIVERSIAL 5-12MM

## (undated) DEVICE — XI ARM FORCEP CADIERE 8MM

## (undated) DEVICE — PACK PERI GYN

## (undated) DEVICE — DRAIN PENROSE .25" X 18" LATEX

## (undated) DEVICE — DRSG COMBINE 5 X 9"

## (undated) DEVICE — POSITIONER PINK PAD PIGAZZI SYSTEM